# Patient Record
Sex: FEMALE | Race: WHITE | NOT HISPANIC OR LATINO | Employment: UNEMPLOYED | URBAN - METROPOLITAN AREA
[De-identification: names, ages, dates, MRNs, and addresses within clinical notes are randomized per-mention and may not be internally consistent; named-entity substitution may affect disease eponyms.]

---

## 2021-07-24 ENCOUNTER — HOSPITAL ENCOUNTER (EMERGENCY)
Facility: HOSPITAL | Age: 46
Discharge: HOME | End: 2021-07-25
Attending: EMERGENCY MEDICINE | Admitting: STUDENT IN AN ORGANIZED HEALTH CARE EDUCATION/TRAINING PROGRAM
Payer: MEDICAID

## 2021-07-24 DIAGNOSIS — R11.0 NAUSEA: ICD-10-CM

## 2021-07-24 DIAGNOSIS — J30.9 ALLERGIC RHINITIS, UNSPECIFIED SEASONALITY, UNSPECIFIED TRIGGER: ICD-10-CM

## 2021-07-24 DIAGNOSIS — U07.1 COVID-19 VIRUS INFECTION: Primary | ICD-10-CM

## 2021-07-24 LAB
CTP QC/QA: YES
POCT GLUCOSE: 112 MG/DL (ref 70–110)
SARS-COV-2 RDRP RESP QL NAA+PROBE: POSITIVE

## 2021-07-24 PROCEDURE — 25000003 PHARM REV CODE 250: Mod: ER | Performed by: NURSE PRACTITIONER

## 2021-07-24 PROCEDURE — 25000003 PHARM REV CODE 250: Performed by: EMERGENCY MEDICINE

## 2021-07-24 PROCEDURE — U0002 COVID-19 LAB TEST NON-CDC: HCPCS | Mod: ER | Performed by: PHYSICIAN ASSISTANT

## 2021-07-24 PROCEDURE — 99284 EMERGENCY DEPT VISIT MOD MDM: CPT | Mod: 25

## 2021-07-24 PROCEDURE — 63600175 PHARM REV CODE 636 W HCPCS: Performed by: EMERGENCY MEDICINE

## 2021-07-24 PROCEDURE — M0243 CASIRIVI AND IMDEVI INFUSION: HCPCS | Performed by: EMERGENCY MEDICINE

## 2021-07-24 RX ORDER — IBUPROFEN 600 MG/1
600 TABLET ORAL EVERY 6 HOURS PRN
Qty: 20 TABLET | Refills: 0 | OUTPATIENT
Start: 2021-07-24 | End: 2022-07-03

## 2021-07-24 RX ORDER — ONDANSETRON 4 MG/1
4 TABLET, ORALLY DISINTEGRATING ORAL
Status: COMPLETED | OUTPATIENT
Start: 2021-07-24 | End: 2021-07-24

## 2021-07-24 RX ORDER — SODIUM CHLORIDE 0.9 % (FLUSH) 0.9 %
10 SYRINGE (ML) INJECTION
Status: DISCONTINUED | OUTPATIENT
Start: 2021-07-24 | End: 2021-07-25 | Stop reason: HOSPADM

## 2021-07-24 RX ORDER — ALBUTEROL SULFATE 90 UG/1
2 AEROSOL, METERED RESPIRATORY (INHALATION)
Status: DISCONTINUED | OUTPATIENT
Start: 2021-07-24 | End: 2021-07-25 | Stop reason: HOSPADM

## 2021-07-24 RX ORDER — DEXTROMETHORPHAN HYDROBROMIDE, GUAIFENESIN 5; 100 MG/5ML; MG/5ML
650 LIQUID ORAL EVERY 8 HOURS
Qty: 20 TABLET | Refills: 0 | OUTPATIENT
Start: 2021-07-24 | End: 2022-07-03

## 2021-07-24 RX ORDER — DIPHENHYDRAMINE HYDROCHLORIDE 50 MG/ML
25 INJECTION INTRAMUSCULAR; INTRAVENOUS ONCE AS NEEDED
Status: DISCONTINUED | OUTPATIENT
Start: 2021-07-24 | End: 2021-07-25 | Stop reason: HOSPADM

## 2021-07-24 RX ORDER — EPINEPHRINE 0.3 MG/.3ML
0.3 INJECTION SUBCUTANEOUS
Status: DISCONTINUED | OUTPATIENT
Start: 2021-07-24 | End: 2021-07-25 | Stop reason: HOSPADM

## 2021-07-24 RX ORDER — METHYLPREDNISOLONE SOD SUCC 125 MG
40 VIAL (EA) INJECTION ONCE AS NEEDED
Status: DISCONTINUED | OUTPATIENT
Start: 2021-07-24 | End: 2021-07-25 | Stop reason: HOSPADM

## 2021-07-24 RX ORDER — ACETAMINOPHEN 325 MG/1
650 TABLET ORAL ONCE AS NEEDED
Status: DISCONTINUED | OUTPATIENT
Start: 2021-07-24 | End: 2021-07-25 | Stop reason: HOSPADM

## 2021-07-24 RX ORDER — CETIRIZINE HYDROCHLORIDE 10 MG/1
10 TABLET ORAL DAILY
Qty: 30 TABLET | Refills: 0 | OUTPATIENT
Start: 2021-07-24 | End: 2022-07-03

## 2021-07-24 RX ORDER — ONDANSETRON 4 MG/1
4 TABLET, ORALLY DISINTEGRATING ORAL ONCE AS NEEDED
Status: DISCONTINUED | OUTPATIENT
Start: 2021-07-24 | End: 2021-07-25 | Stop reason: HOSPADM

## 2021-07-24 RX ORDER — FLUTICASONE PROPIONATE 50 MCG
1 SPRAY, SUSPENSION (ML) NASAL 2 TIMES DAILY PRN
Qty: 15 G | Refills: 0 | OUTPATIENT
Start: 2021-07-24 | End: 2022-07-03

## 2021-07-24 RX ORDER — ONDANSETRON 4 MG/1
4 TABLET, ORALLY DISINTEGRATING ORAL EVERY 8 HOURS PRN
Qty: 20 TABLET | Refills: 0 | OUTPATIENT
Start: 2021-07-24 | End: 2022-07-03

## 2021-07-24 RX ADMIN — CASIRIVIMAB AND IMDEVIMAB 600 MG: 600; 600 INJECTION, SOLUTION, CONCENTRATE INTRAVENOUS at 10:07

## 2021-07-24 RX ADMIN — ONDANSETRON 4 MG: 4 TABLET, ORALLY DISINTEGRATING ORAL at 08:07

## 2021-07-25 VITALS
SYSTOLIC BLOOD PRESSURE: 137 MMHG | TEMPERATURE: 99 F | OXYGEN SATURATION: 100 % | RESPIRATION RATE: 20 BRPM | DIASTOLIC BLOOD PRESSURE: 87 MMHG | HEART RATE: 85 BPM | BODY MASS INDEX: 35.52 KG/M2 | WEIGHT: 221 LBS | HEIGHT: 66 IN

## 2022-07-03 ENCOUNTER — HOSPITAL ENCOUNTER (EMERGENCY)
Facility: HOSPITAL | Age: 47
Discharge: HOME | End: 2022-07-03
Attending: EMERGENCY MEDICINE
Payer: MEDICAID

## 2022-07-03 VITALS
HEART RATE: 75 BPM | WEIGHT: 230 LBS | RESPIRATION RATE: 17 BRPM | BODY MASS INDEX: 36.96 KG/M2 | SYSTOLIC BLOOD PRESSURE: 130 MMHG | HEIGHT: 66 IN | DIASTOLIC BLOOD PRESSURE: 80 MMHG | TEMPERATURE: 98 F | OXYGEN SATURATION: 98 %

## 2022-07-03 DIAGNOSIS — M25.512 SHOULDER PAIN, LEFT: Primary | ICD-10-CM

## 2022-07-03 LAB — POCT GLUCOSE: 113 MG/DL (ref 70–110)

## 2022-07-03 PROCEDURE — 63600175 PHARM REV CODE 636 W HCPCS: Mod: ER | Performed by: NURSE PRACTITIONER

## 2022-07-03 PROCEDURE — 82962 GLUCOSE BLOOD TEST: CPT | Mod: ER

## 2022-07-03 PROCEDURE — 99284 EMERGENCY DEPT VISIT MOD MDM: CPT | Mod: 25,ER

## 2022-07-03 PROCEDURE — 96372 THER/PROPH/DIAG INJ SC/IM: CPT | Performed by: NURSE PRACTITIONER

## 2022-07-03 RX ORDER — ROSUVASTATIN CALCIUM 20 MG/1
20 TABLET, COATED ORAL NIGHTLY
COMMUNITY
Start: 2022-07-03 | End: 2022-07-03

## 2022-07-03 RX ORDER — DEXTROMETHORPHAN HYDROBROMIDE, GUAIFENESIN 5; 100 MG/5ML; MG/5ML
650 LIQUID ORAL EVERY 8 HOURS
Qty: 20 TABLET | Refills: 0 | Status: SHIPPED | OUTPATIENT
Start: 2022-07-03 | End: 2022-07-08

## 2022-07-03 RX ORDER — ASCORBIC ACID 500 MG
500 TABLET ORAL
COMMUNITY

## 2022-07-03 RX ORDER — IBUPROFEN 600 MG/1
600 TABLET ORAL EVERY 6 HOURS PRN
Qty: 20 TABLET | Refills: 0 | Status: SHIPPED | OUTPATIENT
Start: 2022-07-03 | End: 2022-07-08

## 2022-07-03 RX ORDER — KETOROLAC TROMETHAMINE 30 MG/ML
30 INJECTION, SOLUTION INTRAMUSCULAR; INTRAVENOUS
Status: COMPLETED | OUTPATIENT
Start: 2022-07-03 | End: 2022-07-03

## 2022-07-03 RX ORDER — LANOLIN ALCOHOL/MO/W.PET/CERES
100 CREAM (GRAM) TOPICAL
COMMUNITY

## 2022-07-03 RX ORDER — LOSARTAN POTASSIUM 25 MG/1
25 TABLET ORAL DAILY
COMMUNITY
Start: 2022-02-10

## 2022-07-03 RX ORDER — BLOOD SUGAR DIAGNOSTIC
STRIP MISCELLANEOUS 2 TIMES DAILY
COMMUNITY
Start: 2022-05-29

## 2022-07-03 RX ORDER — DULAGLUTIDE 4.5 MG/.5ML
INJECTION, SOLUTION SUBCUTANEOUS
COMMUNITY
Start: 2022-06-15

## 2022-07-03 RX ORDER — DICLOFENAC SODIUM 10 MG/G
2 GEL TOPICAL 4 TIMES DAILY
Qty: 100 G | Refills: 0 | Status: SHIPPED | OUTPATIENT
Start: 2022-07-03 | End: 2023-01-10

## 2022-07-03 RX ORDER — LEVOCETIRIZINE DIHYDROCHLORIDE 5 MG/1
TABLET, FILM COATED ORAL
COMMUNITY
Start: 2022-06-24 | End: 2022-07-03

## 2022-07-03 RX ORDER — VIT C/E/ZN/COPPR/LUTEIN/ZEAXAN 250MG-90MG
1000 CAPSULE ORAL
COMMUNITY

## 2022-07-03 RX ORDER — DAPAGLIFLOZIN 10 MG/1
10 TABLET, FILM COATED ORAL DAILY
COMMUNITY
Start: 2022-06-15

## 2022-07-03 RX ORDER — CYCLOBENZAPRINE HCL 10 MG
10 TABLET ORAL 3 TIMES DAILY PRN
Qty: 15 TABLET | Refills: 0 | Status: SHIPPED | OUTPATIENT
Start: 2022-07-03 | End: 2022-07-08

## 2022-07-03 RX ADMIN — KETOROLAC TROMETHAMINE 30 MG: 30 INJECTION, SOLUTION INTRAMUSCULAR at 11:07

## 2022-07-03 NOTE — ED PROVIDER NOTES
Encounter Date: 7/3/2022       History     Chief Complaint   Patient presents with    Shoulder Pain     Left shoulder pain, increased with movement, denies injury, works as a      48 y/o female presents to the ED with complaints of left shoulder pain for a while.  Patient states that she is a  and the pain is worse with movement.  She is right hand dominant.  Patient denies trauma, injury, rash, fever, chest pain, SOB, numbness, weakness, tingling, abdominal pain, back pain, dysuria, hematuria, nausea, vomiting, diarrhea, or any other complaints.  Patient rates the pain as 10/10 and has tried a lidoderm patch with some relief but nothing today.                  Review of patient's allergies indicates:  No Known Allergies  Past Medical History:   Diagnosis Date    Diabetes mellitus     Hyperlipemia     Hypertension      History reviewed. No pertinent surgical history.  History reviewed. No pertinent family history.  Social History     Tobacco Use    Smoking status: Never Smoker    Smokeless tobacco: Never Used   Substance Use Topics    Alcohol use: Yes    Drug use: Yes     Types: Marijuana     Review of Systems   Constitutional: Negative for chills and fever.   HENT: Negative for congestion, ear pain, rhinorrhea, sore throat and trouble swallowing.    Eyes: Negative for pain, discharge and redness.   Respiratory: Negative for cough and shortness of breath.    Cardiovascular: Negative for chest pain.   Gastrointestinal: Negative for abdominal pain, diarrhea, nausea and vomiting.   Genitourinary: Negative for decreased urine volume and dysuria.   Musculoskeletal: Positive for arthralgias. Negative for back pain, neck pain and neck stiffness.   Skin: Negative for rash.   Neurological: Negative for dizziness, weakness, light-headedness, numbness and headaches.   Psychiatric/Behavioral: Negative for confusion.       Physical Exam     Initial Vitals [07/03/22 1045]   BP Pulse Resp Temp SpO2    137/77 99 20 98.4 °F (36.9 °C) 100 %      MAP       --         Physical Exam    Nursing note and vitals reviewed.  Constitutional: Vital signs are normal. She appears well-developed.  Non-toxic appearance. She does not appear ill.   HENT:   Head: Normocephalic and atraumatic.   Right Ear: External ear normal.   Left Ear: External ear normal.   Nose: Nose normal.   Mouth/Throat: Oropharynx is clear and moist.   Eyes: Conjunctivae are normal.   Neck:   Normal range of motion.  Cardiovascular: Normal rate and regular rhythm.   Pulmonary/Chest: Effort normal and breath sounds normal. She exhibits no tenderness.   Musculoskeletal:      Left shoulder: Tenderness present. No swelling. Normal range of motion. Normal strength. Normal pulse.      Cervical back: Normal range of motion.      Comments: Tenderness to left shoulder with no swelling, rash, erythema, bruising, or obvious deformity; normal ROM with pain; normal strength and sensation; +2 radial pulse     Neurological: She is alert and oriented to person, place, and time. Gait normal. GCS eye subscore is 4. GCS verbal subscore is 5. GCS motor subscore is 6.   Skin: Skin is warm, dry and intact. No rash noted.   Psychiatric: She has a normal mood and affect. Her speech is normal and behavior is normal. Judgment and thought content normal.         ED Course   Procedures  Labs Reviewed   POCT GLUCOSE - Abnormal; Notable for the following components:       Result Value    POCT Glucose 113 (*)     All other components within normal limits   POCT GLUCOSE, HAND-HELD DEVICE          Imaging Results          X-Ray Shoulder Trauma Left (Final result)  Result time 07/03/22 12:16:21    Final result by Lewis Dahl MD (07/03/22 12:16:21)                 Impression:      No acute displaced fracture-dislocation identified.      Electronically signed by: Lewis Dahl MD  Date:    07/03/2022  Time:    12:16             Narrative:    EXAMINATION:  XR SHOULDER TRAUMA 3 VIEW  LEFT    CLINICAL HISTORY:  Pain in left shoulder    TECHNIQUE:  Three views of the left shoulder were performed.    COMPARISON  None    FINDINGS:  Bones are well mineralized. Overall alignment is within normal limits. No displaced fracture, dislocation or destructive osseous process.  Mild degenerative change noted about the shoulder.  Left lung apex is clear.  No subcutaneous emphysema or radiodense retained foreign body.                                 Medications   ketorolac injection 30 mg (30 mg Intramuscular Given 7/3/22 1115)           APC / Resident Notes:   This is an evaluation of a 47 y.o. female that presents to the Emergency Department for left shoulder pain    Physical Exam shows a non-toxic, afebrile, and well appearing female. Tenderness to left shoulder with no swelling, rash, erythema, bruising, or obvious deformity; normal ROM with pain; normal strength and sensation; +2 radial pulse    Vital signs are reassuring. If available, previous records reviewed.   RESULTS: ; Xray negative     My overall impression is left shoulder pain. I considered, but at this time, do not suspect fracture, dislocation, cellulitis, abscess, septic joint.    ED Course: Xray, CBG, Toradol. Discharge Meds/Instructions: tylenol, Ibuprofen, Voltaren, Flexeril. The diagnosis, treatment plan, instructions for follow-up as well as ED return precautions were discussed. All questions have been answered.                      Clinical Impression:   Final diagnoses:  [M25.512] Shoulder pain, left (Primary)          ED Disposition Condition    Discharge Stable        ED Prescriptions     Medication Sig Dispense Start Date End Date Auth. Provider    acetaminophen (TYLENOL) 650 MG TbSR Take 1 tablet (650 mg total) by mouth every 8 (eight) hours. for 5 days 20 tablet 7/3/2022 7/8/2022 REY Cedillo    ibuprofen (ADVIL,MOTRIN) 600 MG tablet Take 1 tablet (600 mg total) by mouth every 6 (six) hours as needed for Pain. 20 tablet  7/3/2022 7/8/2022 REY Cedillo    cyclobenzaprine (FLEXERIL) 10 MG tablet Take 1 tablet (10 mg total) by mouth 3 (three) times daily as needed for Muscle spasms. 15 tablet 7/3/2022 7/8/2022 REY Cedillo    diclofenac sodium (VOLTAREN) 1 % Gel Apply 2 g topically 4 (four) times daily. for 14 days 100 g 7/3/2022 7/17/2022 REY Cedillo        Follow-up Information     Follow up With Specialties Details Why Contact Info    Vera Bynum MD Orthopedic Surgery Schedule an appointment as soon as possible for a visit in 2 days  [address] [facility] [address]  [phone]      [facility] - Musculosketetal Neuromusculoskeletal Medicine Schedule an appointment as soon as possible for a visit in 2 days  [address] [facility] [address]  [phone]      [facility] ED Emergency Medicine Go to  If symptoms worsen [address] [facility] [address] [phone]           REY Cedillo  07/03/22 1247

## 2022-07-03 NOTE — ED TRIAGE NOTES
Patient presents to ED c/o Shoulder pain, pt reports left shoulder pain with activity, patient states she woke up with sharp shoulder pain with activity.     Denies trauma, hx of arthritis, loss of ROM, fever, chills, numbness/tingling    AAO x 4.

## 2022-07-03 NOTE — Clinical Note
"Caroline Bullockya" Gilberto was seen and treated in our emergency department on 7/3/2022.  She may return to work on 07/05/2022.       If you have any questions or concerns, please don't hesitate to call.      REY Cedillo"

## 2022-08-01 ENCOUNTER — OFFICE VISIT (OUTPATIENT)
Dept: ORTHOPEDICS | Facility: CLINIC | Age: 47
End: 2022-08-01
Payer: MEDICAID

## 2022-08-01 VITALS — HEIGHT: 66 IN | BODY MASS INDEX: 20.89 KG/M2 | WEIGHT: 130 LBS

## 2022-08-01 DIAGNOSIS — M75.52 BURSITIS OF LEFT SHOULDER: ICD-10-CM

## 2022-08-01 PROCEDURE — 99999 PR PBB SHADOW E&M-EST. PATIENT-LVL III: ICD-10-PCS | Mod: PBBFAC,,, | Performed by: ORTHOPAEDIC SURGERY

## 2022-08-01 PROCEDURE — 3008F BODY MASS INDEX DOCD: CPT | Mod: CPTII,,, | Performed by: ORTHOPAEDIC SURGERY

## 2022-08-01 PROCEDURE — 3008F PR BODY MASS INDEX (BMI) DOCUMENTED: ICD-10-PCS | Mod: CPTII,,, | Performed by: ORTHOPAEDIC SURGERY

## 2022-08-01 PROCEDURE — 4010F PR ACE/ARB THEARPY RXD/TAKEN: ICD-10-PCS | Mod: CPTII,,, | Performed by: ORTHOPAEDIC SURGERY

## 2022-08-01 PROCEDURE — 99213 OFFICE O/P EST LOW 20 MIN: CPT | Mod: PBBFAC,PN | Performed by: ORTHOPAEDIC SURGERY

## 2022-08-01 PROCEDURE — 4010F ACE/ARB THERAPY RXD/TAKEN: CPT | Mod: CPTII,,, | Performed by: ORTHOPAEDIC SURGERY

## 2022-08-01 PROCEDURE — 99203 PR OFFICE/OUTPT VISIT, NEW, LEVL III, 30-44 MIN: ICD-10-PCS | Mod: S$PBB,,, | Performed by: ORTHOPAEDIC SURGERY

## 2022-08-01 PROCEDURE — 1159F MED LIST DOCD IN RCRD: CPT | Mod: CPTII,,, | Performed by: ORTHOPAEDIC SURGERY

## 2022-08-01 PROCEDURE — 99999 PR PBB SHADOW E&M-EST. PATIENT-LVL III: CPT | Mod: PBBFAC,,, | Performed by: ORTHOPAEDIC SURGERY

## 2022-08-01 PROCEDURE — 99203 OFFICE O/P NEW LOW 30 MIN: CPT | Mod: S$PBB,,, | Performed by: ORTHOPAEDIC SURGERY

## 2022-08-01 PROCEDURE — 1159F PR MEDICATION LIST DOCUMENTED IN MEDICAL RECORD: ICD-10-PCS | Mod: CPTII,,, | Performed by: ORTHOPAEDIC SURGERY

## 2022-08-01 RX ORDER — IBUPROFEN 800 MG/1
800 TABLET ORAL 2 TIMES DAILY WITH MEALS
Qty: 40 TABLET | Refills: 1 | Status: SHIPPED | OUTPATIENT
Start: 2022-08-01 | End: 2022-08-31

## 2022-08-01 NOTE — PROGRESS NOTES
"Subjective:      Patient ID: Caroline Macias is a 47 y.o. female.    Chief Complaint: Consult (L Shoulder )      HPI  Caroline Macias is a  47 y.o. female presenting today for left shoulder pain.  There was not a history of trauma.  Onset of symptoms began about 3 weeks ago  Symptoms may have been brought on by over use the left arm  She was actually seen at urgent care x-rays taken reported negative for fracture  She was started on a muscle relaxer and some medicated cream  She has also been resting the arm and symptoms have improved  No numbness or tingling reported.      Review of patient's allergies indicates:  No Known Allergies      Current Outpatient Medications   Medication Sig Dispense Refill    ascorbic acid, vitamin C, (VITAMIN C) 500 MG tablet Take 500 mg by mouth.      cyanocobalamin (VITAMIN B-12) 1000 MCG tablet Take 100 mcg by mouth.      FARXIGA 10 mg tablet Take 10 mg by mouth once daily.      losartan (COZAAR) 25 MG tablet Take 25 mg by mouth once daily.      ONETOUCH ULTRA TEST Strp 2 (two) times a day. Test      TRULICITY 4.5 mg/0.5 mL pen injector SMARTSI.5 Milligram(s) SUB-Q Once a Week      cholecalciferol, vitamin D3, (VITAMIN D3) 25 mcg (1,000 unit) capsule Take 1,000 Units by mouth.      diclofenac sodium (VOLTAREN) 1 % Gel Apply 2 g topically 4 (four) times daily. for 14 days 100 g 0    ibuprofen (ADVIL,MOTRIN) 800 MG tablet Take 1 tablet (800 mg total) by mouth 2 (two) times daily with meals. 40 tablet 1     No current facility-administered medications for this visit.       Past Medical History:   Diagnosis Date    Diabetes mellitus     Hyperlipemia     Hypertension        History reviewed. No pertinent surgical history.    Review of Systems:  ROS    OBJECTIVE:     PHYSICAL EXAM:  Height: 5' 6" (167.6 cm) Weight: 59 kg (130 lb)  Vitals:    22 1310   Weight: 59 kg (130 lb)   Height: 5' 6" (1.676 m)   PainSc:   3     Well developed, well nourished female in no acute " distress  Alert and oriented x 3  HEENT- Normal exam  Lungs- Clear to auscultation  Heart- Regular rate and rhythm  Abdomen- Soft nontender  Extremity exam- examination left shoulder no tenderness no swelling no bruising  Range of motion full but she does have a mildly positive impingement sign with abduction internal rotation of the shoulder  No instability  Neurologic exam intact  Rotator cuff strength intact    RADIOGRAPHS:  AP lateral x-ray left shoulder demonstrates mild spurring anterolateral acromion  Comments: I have personally reviewed the imaging and I agree with the above radiologist's report.    ASSESSMENT/PLAN:     IMPRESSION:  Left shoulder impingement bursitis    PLAN:  I explained the nature of the problem to the patient  Fortunately symptoms are improving so I recommended she continue with rest and avoid overhead lifting  I have also started her on Motrin 800 b.i.d. with food  Follow-up 3 weeks for recheck       - We talked at length about the anatomy and pathophysiology of   Encounter Diagnosis   Name Primary?    Bursitis of left shoulder            Disclaimer: This note has been generated using voice-recognition software. There may be typographical errors that have been missed during proof-reading.

## 2023-01-10 ENCOUNTER — OFFICE VISIT (OUTPATIENT)
Dept: ORTHOPEDICS | Facility: CLINIC | Age: 48
End: 2023-01-10
Payer: MEDICAID

## 2023-01-10 VITALS — HEIGHT: 66 IN | BODY MASS INDEX: 20.89 KG/M2 | WEIGHT: 130 LBS

## 2023-01-10 DIAGNOSIS — M75.52 BURSITIS OF LEFT SHOULDER: Primary | ICD-10-CM

## 2023-01-10 PROCEDURE — 1159F MED LIST DOCD IN RCRD: CPT | Mod: CPTII,,, | Performed by: ORTHOPAEDIC SURGERY

## 2023-01-10 PROCEDURE — 99213 OFFICE O/P EST LOW 20 MIN: CPT | Mod: S$PBB,25,, | Performed by: ORTHOPAEDIC SURGERY

## 2023-01-10 PROCEDURE — 99213 OFFICE O/P EST LOW 20 MIN: CPT | Mod: PBBFAC,PN,25 | Performed by: ORTHOPAEDIC SURGERY

## 2023-01-10 PROCEDURE — 3008F BODY MASS INDEX DOCD: CPT | Mod: CPTII,,, | Performed by: ORTHOPAEDIC SURGERY

## 2023-01-10 PROCEDURE — 20610 PR DRAIN/INJECT LARGE JOINT/BURSA: ICD-10-PCS | Mod: S$PBB,LT,, | Performed by: ORTHOPAEDIC SURGERY

## 2023-01-10 PROCEDURE — 20610 DRAIN/INJ JOINT/BURSA W/O US: CPT | Mod: PBBFAC,PN | Performed by: ORTHOPAEDIC SURGERY

## 2023-01-10 PROCEDURE — 99999 PR PBB SHADOW E&M-EST. PATIENT-LVL III: CPT | Mod: PBBFAC,,, | Performed by: ORTHOPAEDIC SURGERY

## 2023-01-10 PROCEDURE — 1159F PR MEDICATION LIST DOCUMENTED IN MEDICAL RECORD: ICD-10-PCS | Mod: CPTII,,, | Performed by: ORTHOPAEDIC SURGERY

## 2023-01-10 PROCEDURE — 20610 DRAIN/INJ JOINT/BURSA W/O US: CPT | Mod: S$PBB,LT,, | Performed by: ORTHOPAEDIC SURGERY

## 2023-01-10 PROCEDURE — 3008F PR BODY MASS INDEX (BMI) DOCUMENTED: ICD-10-PCS | Mod: CPTII,,, | Performed by: ORTHOPAEDIC SURGERY

## 2023-01-10 PROCEDURE — 99999 PR PBB SHADOW E&M-EST. PATIENT-LVL III: ICD-10-PCS | Mod: PBBFAC,,, | Performed by: ORTHOPAEDIC SURGERY

## 2023-01-10 PROCEDURE — 99213 PR OFFICE/OUTPT VISIT, EST, LEVL III, 20-29 MIN: ICD-10-PCS | Mod: S$PBB,25,, | Performed by: ORTHOPAEDIC SURGERY

## 2023-01-10 RX ORDER — IBUPROFEN 600 MG/1
600 TABLET ORAL 2 TIMES DAILY WITH MEALS
Qty: 60 TABLET | Refills: 2 | Status: SHIPPED | OUTPATIENT
Start: 2023-01-10

## 2023-01-10 RX ORDER — TRIAMCINOLONE ACETONIDE 40 MG/ML
40 INJECTION, SUSPENSION INTRA-ARTICULAR; INTRAMUSCULAR
Status: COMPLETED | OUTPATIENT
Start: 2023-01-10 | End: 2023-01-10

## 2023-01-10 RX ORDER — IBUPROFEN 800 MG/1
800 TABLET ORAL 2 TIMES DAILY
COMMUNITY
Start: 2022-12-08

## 2023-01-10 RX ORDER — ROSUVASTATIN CALCIUM 20 MG/1
20 TABLET, COATED ORAL
COMMUNITY
Start: 2022-09-01

## 2023-01-10 RX ORDER — LEVOCETIRIZINE DIHYDROCHLORIDE 5 MG/1
5 TABLET, FILM COATED ORAL
COMMUNITY
Start: 2022-12-16

## 2023-01-10 RX ADMIN — TRIAMCINOLONE ACETONIDE 40 MG: 40 INJECTION, SUSPENSION INTRA-ARTICULAR; INTRAMUSCULAR at 10:01

## 2023-01-10 NOTE — PROGRESS NOTES
"Subjective:      Patient ID: Caroline Macias is a 47 y.o. female.  Chief Complaint: Follow-up of the Left Shoulder      HPI  Caroline Macias is a  47 y.o. female presenting today for follow up of bilateral shoulder pain.  She reports that she is having some ongoing symptoms left shoulder worse than right   Symptoms worse with overhead lifting occasionally at night   No numbness or tingling reported.    Review of patient's allergies indicates:  No Known Allergies      Current Outpatient Medications   Medication Sig Dispense Refill    ascorbic acid, vitamin C, (VITAMIN C) 500 MG tablet Take 500 mg by mouth.      cholecalciferol, vitamin D3, (VITAMIN D3) 25 mcg (1,000 unit) capsule Take 1,000 Units by mouth.      cyanocobalamin (VITAMIN B-12) 1000 MCG tablet Take 100 mcg by mouth.      diclofenac sodium (VOLTAREN) 1 % Gel Apply 2 g topically 4 (four) times daily. for 14 days 100 g 0    FARXIGA 10 mg tablet Take 10 mg by mouth once daily.      ibuprofen (ADVIL,MOTRIN) 800 MG tablet Take 800 mg by mouth 2 (two) times daily.      levocetirizine (XYZAL) 5 MG tablet Take 5 mg by mouth.      losartan (COZAAR) 25 MG tablet Take 25 mg by mouth once daily.      ONETOUCH ULTRA TEST Strp 2 (two) times a day. Test      rosuvastatin (CRESTOR) 20 MG tablet Take 20 mg by mouth.      TRULICITY 4.5 mg/0.5 mL pen injector SMARTSI.5 Milligram(s) SUB-Q Once a Week       No current facility-administered medications for this visit.       Past Medical History:   Diagnosis Date    Diabetes mellitus     Hyperlipemia     Hypertension        No past surgical history on file.    OBJECTIVE:   PHYSICAL EXAM:  Height: 5' 6" (167.6 cm) Weight: 59 kg (130 lb)  Vitals:    01/10/23 0957   Weight: 59 kg (130 lb)   Height: 5' 6" (1.676 m)   PainSc:   3   PainLoc: Shoulder     Ortho/SPM Exam  Examination of the shoulders both shoulders have full range of motion but she does have a positive impingement sign on the left negative on the right rotator cuff " strength intact no instability   Neurologic exam intact    RADIOGRAPHS:  None  Comments: I have personally reviewed the imaging and I agree with the above radiologist's report.    ASSESSMENT/PLAN:     IMPRESSION:  Bilateral shoulder impingement tendinosis    PLAN:  I explained the nature of the problem to the patient   I have ordered some physical therapy for both shoulders to work on rotator cuff strengthening   I also recommended injection for the left shoulder today which is the more symptomatic side   After pause for time-out identified the left shoulder injected with Kenalog 40 mg 2 cc xylocaine sterile technique  Tolerated the procedure well without complication   I have also started her on Motrin twice a day with food  Follow-up 4-6 weeks    FOLLOW UP:  4-6 weeks    Disclaimer: This note has been generated using voice-recognition software. There may be typographical errors that have been missed during proof-reading.

## 2023-01-17 NOTE — PATIENT INSTRUCTIONS
[facility]  OCCUPATIONAL THERAPY  HOME EXERCISE PROGRAM     Cervical Warm-Up  Hold each for 5seconds, repeat for 5 times, do 3 times per day.           Copyright [email or web address]      Therapist: CORTEZ Thomas, OTR/:

## 2023-01-18 ENCOUNTER — CLINICAL SUPPORT (OUTPATIENT)
Dept: REHABILITATION | Facility: HOSPITAL | Age: 48
End: 2023-01-18
Attending: ORTHOPAEDIC SURGERY
Payer: MEDICAID

## 2023-01-18 DIAGNOSIS — M25.512 LEFT SHOULDER PAIN, UNSPECIFIED CHRONICITY: ICD-10-CM

## 2023-01-18 DIAGNOSIS — M75.52 BURSITIS OF LEFT SHOULDER: ICD-10-CM

## 2023-01-18 DIAGNOSIS — R29.898 SHOULDER WEAKNESS: ICD-10-CM

## 2023-01-18 DIAGNOSIS — M75.42 IMPINGEMENT SYNDROME OF LEFT SHOULDER: ICD-10-CM

## 2023-01-18 DIAGNOSIS — M25.612 DECREASED RANGE OF MOTION OF SHOULDER, LEFT: ICD-10-CM

## 2023-01-18 PROCEDURE — 97165 OT EVAL LOW COMPLEX 30 MIN: CPT | Mod: PN

## 2023-01-18 PROCEDURE — 97530 THERAPEUTIC ACTIVITIES: CPT | Mod: PN

## 2023-01-18 NOTE — PLAN OF CARE
[facility]  Occupational Therapy Initial Evaluation    Date: 1/18/2023  Name: Caroline Macias  [facility] Number: [id number]    Therapy Diagnosis:   Encounter Diagnoses   Name Primary?    Bursitis of left shoulder     Left shoulder pain, unspecified chronicity     Decreased range of motion of shoulder, left     Shoulder weakness     Impingement syndrome of left shoulder      Physician: Rogerio Bailey Jr., *    Physician Orders: Eval & Treat  Medical Diagnosis: M75.52 (ICD-10-CM) - Bursitis of left shoulder   Surgical Procedure and Date: N/a  Evaluation Date: 1/18/2023  Insurance Authorization Period Expiration: 1/10/2024  Plan of Care Expiration: 4/18/2023  Progress Note Due: 2/21/2023   Date of Return to MD: 2/22/2023  Visit # / Visits authorized: 1 / 1  FOTO: 1/3    Precautions:  Standard, DM2, HTN    Time In: 10:05AM  Time Out: 10:50AM  Total Appointment Time (timed & untimed codes): 45 minutes    SUBJECTIVE     Date of Onset: ~6/2022    History of Current Condition/Mechanism of Injury/Patient report: Caroline reports: Her arm randomly just started hurting a lot while she was getting her hair done. Her left arm pain moves from front of shoulder to back, and down upper arm. Reaching behind her back hurts. It feels a little better since she got a shot at the last appointment.     Falls: No    Involved Side: Bilateral, Left worse  Dominant Side: Right, but uses both arms to fix hair  Imaging: x-ray 7/3/2022 findings: Bones are well mineralized. Overall alignment is within normal limits. No displaced fracture, dislocation or destructive osseous process.  Mild degenerative change noted about the shoulder.  Left lung apex is clear.  No subcutaneous emphysema or radiodense retained foreign body.   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     Current Functional Status   Home/Living environment: lives with their family      Limitation of Functional Status as follows:   ADLs/IADLs:     - Bathing: Cleaning behind back is a challenge, but hot water helps    - Dressing/Grooming: Putting arm through clothes, pulling up pants/underwear, trouble wiping bottom/reaching behind    Leisure: Bowling, pool - both unable at this time    Pain:  Functional Pain Scale Rating 0-10: Current 2/10, worst 7/10, best 0/10   Location: Left shoulder mainly, All around shoulder, moves down upper arm  Description: Aching and Sharp  Aggravating Factors: Getting out of bed/chair and washing behind her back, reaching, sudden moves  Easing Factors: pain medication, heating pad, injection, and rest    Patient's Goals for Therapy: Feel better and be able to do what I like    Medical History:   Past Medical History:   Diagnosis Date    Diabetes mellitus     Hyperlipemia     Hypertension        Surgical History:    has no past surgical history on file.    Medications:   has a current medication list which includes the following prescription(s): ascorbic acid (vitamin c), cholecalciferol (vitamin d3), cyanocobalamin, diclofenac sodium, farxiga, ibuprofen, ibuprofen, levocetirizine, losartan, onetouch ultra test, rosuvastatin, trulicity, and [DISCONTINUED] cetirizine.    Allergies:   Review of patient's allergies indicates:  No Known Allergies       OBJECTIVE     Palpation: Some pain with palpation to anterior shoulder      Shoulder ROM. Measured in degrees.    (R) UE (unaffected) (L) UE (1/18/23)       AROM AROM Norm   Shoulder Flexion  140 125 180   Shoulder Abduction  140 110 0-180   Shoulder Extension  45 45 0-50   Shoulder Internal Rotation 90 45 0-90   Shoulder External Rotation  90 68 0-90   Comment: Painful with all motions    Manual Muscle Test:   Patient decline to participate due to pain, 3+ for all shoulder motions in limited ROM, pain with any resistance  Shoulder  Flexion RUE:      Shoulder Extension RUE:     Shoulder Abduction RUE:      Internal Rotation RUE:     External Rotation RUE:           Shoulder Flexion LUE:     Shoulder Extension LUE:     Shoulder Abduction LUE:     Internal Rotation LUE:      External Rotation LUE:              Special Tests:   TEST 1/18/2023 1/18/2023   Shoulder Left Right   Bentley Tao Positive  Negative    Neer Imgingement Positive  Negative    Yergason's  Negative  Negative    Empty Can (Supraspinatus) Positive  Negative      Posture Alignment : slouched posture, forward head    Joint integrity: Normal    Scapular Control/Dyskinesis:    Normal / Subtle / Obvious   Left Subtle   Right Normal       Limitation/Restriction for FOTO Shoulder Survey    Therapist reviewed FOTO scores for Caroline Macias on 1/18/2023.   FOTO documents entered into [employer] - see Media section.    Limitation Score: 52%         Treatment     Total Treatment time (time-based codes) separate from Evaluation: 15 minutes    Caroline received the treatments listed below:     Therapeutic activities to improve functional performance for 15  minutes, including:  - Provided initial HEP  - Cervical neck retractions (Onofre method)  - Gentle doorway stretch  - Shoulder rolls  - Scapular retractors    Patient Education and Home Exercises      Education provided:   - HEP  - Plan of care  - Goals    Written Home Exercises Provided: yes.  Exercises were reviewed and Caroline was able to demonstrate them prior to the end of the session.  Caroline demonstrated good  understanding of the education provided. See EMR under Patient Instructions for exercises provided during therapy sessions.     Pt was advised to perform these exercises free of pain, and to stop performing them if pain occurs.    Patient/Family Education: role of OT, goals for OT, scheduling/cancellations - pt verbalized understanding. Discussed insurance limitations with patient.    ASSESSMENT     Caroline Macias is a 47 y.o. female  referred to outpatient occupational therapy and presents with a medical diagnosis of bursitis of L shoulder. Tests positive for L shoulder subacromial impingement. Patient presents with the following therapy deficits: Decreased ROM, Decreased muscle strength, and Increased pain and demonstrates limitations as described in the chart below. Following medical record review it is determined that pt will benefit from occupational therapy services in order to maximize pain free and/or functional use of left shoulder/arm. The following goals were discussed with the patient and patient is in agreement with them as to be addressed in the treatment plan. The patient's rehab potential is Good.     Anticipated barriers to occupational therapy: None  Pt has no cultural, educational or language barriers to learning provided.    Profile and History Assessment of Occupational Performance Level of Clinical Decision Making Complexity Score   Occupational Profile:   Caroline Macias is a 47 y.o. female who lives with their family and is currently employed Caroline Macias has difficulty with  ADLs and IADLs as listed previously, which  Affecting their daily functional abilities.      Comorbidities:    has a past medical history of Diabetes mellitus, Hyperlipemia, and Hypertension.    Medical and Therapy History Review:   Brief     Performance Deficits    Physical:  Joint Mobility  Muscle Power/Strength  Muscle Endurance  Control of Voluntary Movement  Muscle Tone  Postural Control  Pain    Cognitive:  No Deficits    Psychosocial:    No Deficits     Clinical Decision Making:  low    Assessment Process:  Problem-Focused Assessments    Modification/Need for Assistance:  Not Necessary    Intervention Selection:  Limited Treatment Options       low  Based on PMHX, co morbidities , data from assessments and functional level of assistance required with task and clinical presentation directly impacting function.       The following goals were  discussed with the patient and patient is in agreement with them as to be addressed in the treatment plan.     Goals:     Short Term Goals (STGs); to be met within 5 weeks (2/21/2023).  1)  Patient to be IND with HEP to maximize R shoulder functional capacity.  2)  Patient will report IND use of modalities at home for pain management.  3)  Assess MMT of shoulder by 2/1/2023  4)  Patient will report a pain level of 0 out of 10 during active range of motion without resistance  5) Increase shoulder external/internal rotation ROM by 25 degrees to increase functional arm use for ADLs/work/leisure activities.    Long Term Goals :To be met by discharge.  1) Patient will report a pain level of 0 out of 10 during lifting of objects at home.  2) Increase shoulder strength to 4+/5 in all shoulder motions without pain.   3) Increase ROM by 45 degrees to increase functional shoulder use for ADLs/work/leisure activities.  4) Patient will demo improved FOTO score by 20 points.  5) Pt will return to prior level of function for ADLs, IADLs, and household management.      PLAN   Plan of Care Certification: 1/18/2023 to 4/18/2023.     Outpatient Occupational Therapy 2 times weekly for 6 weeks to include the following interventions: Manual therapy/joint mobilizations, Modalities for pain management, Therapeutic exercises/activities., and Strengthening.      CORTEZ Thomas, OTR/L      I CERTIFY THE NEED FOR THESE SERVICES FURNISHED UNDER THIS PLAN OF TREATMENT AND WHILE UNDER MY CARE  Physician's comments:      Physician's Signature: ___________________________________________________

## 2023-01-23 NOTE — PROGRESS NOTES
"  [facility]  Occupational Therapy Treatment Note    Date: 1/24/2023  Name: Caroline Macias  [facility] Number: [id number]    Therapy Diagnosis: No diagnosis found.  Physician: Rogerio Bailey Jr., *    Physician Orders: Eval & Treat  Medical Diagnosis: M75.52 (ICD-10-CM) - Bursitis of left shoulder   Surgical Procedure and Date: N/a  Evaluation Date: 1/18/2023  Insurance Authorization Period Expiration: 1/10/2024  Plan of Care Expiration: 4/18/2023  Progress Note Due: 2/21/2023   Date of Return to MD: 2/22/2023  Visit # / Visits authorized: 1 / 1  FOTO: 1/3     Precautions:  Standard, DM2, HTN    Time In: 1:10PM  Time Out: 1:45PM  Total Billable Time: 35 minutes    SUBJECTIVE     Pt reports: "I have been able to tolerate a little bit of pain but not been pushing through too much while doing my home exercises."     She was compliant with home exercise program given last session, completes some of the home exercise program every day.   Response to previous treatment: Good  Functional change: None     Pain: 5/10 upon arrival, 2/10 upon end of session   Location: left shoulder      OBJECTIVE     Objective Measures updated at progress report unless specified.  Palpation: Some pain with palpation to anterior shoulder       Shoulder ROM. Measured in degrees.    (R) UE (unaffected) (L) UE (1/18/23)       AROM AROM Norm   Shoulder Flexion  140 125 180   Shoulder Abduction  140 110 0-180   Shoulder Extension  45 45 0-50   Shoulder Internal Rotation 90 45 0-90   Shoulder External Rotation  90 68 0-90   Comment: Painful with all motions     Manual Muscle Test:   Patient decline to participate due to pain, 3+ for all shoulder motions in limited ROM, pain with any resistance  Shoulder Flexion RUE:   Defer   Shoulder Extension RUE:  -   Shoulder Abduction RUE:   -   Internal Rotation RUE:  -   External Rotation RUE:  -         Shoulder Flexion LUE:  -   Shoulder Extension LUE:  -   Shoulder Abduction LUE:  - "   Internal Rotation LUE:   -   External Rotation LUE:   -            Special Tests:   TEST 1/18/2023 1/18/2023   Shoulder Left Right   Bentley Tao Positive  Negative    Neer Imgingement Positive  Negative    Yergason's  Negative  Negative    Empty Can (Supraspinatus) Positive  Negative       Posture Alignment : slouched posture, forward head     Joint integrity: Normal     Scapular Control/Dyskinesis:     Normal / Subtle / Obvious   Left Subtle   Right Normal         Limitation/Restriction for FOTO Shoulder Survey     Therapist reviewed FOTO scores for Caroline Macias on 1/18/2023.   FOTO documents entered into [employer] - see Media section.     Limitation Score: 52%         Treatment     Caroline received the treatments listed below:     Therapeutic activities to improve functional performance for 35  minutes, including:  - Moist heat to warm shoulder up prior to exercise x3 mins  - Aerobic warm up on arm bike x7 mins  - Shoulder CARs x10 each  - Low Ys on wall x10  - Ws on wall x10  - Manual therapy and joint mobilizations to shoulder   - Shoulder rolls  - Scapular retractors    Patient Education and Home Exercises      Education provided:   - Plan of care  - HEP  - Progress towards goals     Written Home Exercises Provided: Patient instructed to cont prior HEP.  Exercises were reviewed and Caroline was able to demonstrate them prior to the end of the session.  Caroline demonstrated good  understanding of the HEP provided. See EMR under Patient Instructions for exercises provided during therapy sessions.       Assessment     Pt would continue to benefit from skilled OT. Patient participated well in skilled OT session focused on scapular strengthening and joint mobilization to release stress and tension in guarding musculature, and support proper ergonomics. Good response, improvements in pain following.     Caroline is progressing well towards her goals and there are no updates to goals at this time. Pt prognosis is Good.     Pt  will continue to benefit from skilled outpatient occupational therapy to address the deficits listed in the problem list on initial evaluation provide pt/family education and to maximize pt's level of independence in the home and community environment.     Pt's spiritual, cultural and educational needs considered and pt agreeable to plan of care and goals.    Anticipated barriers to occupational therapy: None    Goals:  Short Term Goals (STGs); to be met within 5 weeks (2/21/2023).  1)  Patient to be IND with HEP to maximize R shoulder functional capacity. - progressing not met 1/24/2023   2)  Patient will report IND use of modalities at home for pain management.- progressing not met  1/24/2023   3)  Assess MMT of shoulder by 2/1/2023. - progressing not met  1/24/2023   4)  Patient will report a pain level of 0 out of 10 during active range of motion without resistance. - progressing not met  1/24/2023    5) Increase shoulder external/internal rotation ROM by 25 degrees to increase functional arm use for ADLs/work/leisure activities. - progressing not met  1/24/2023      Long Term Goals :To be met by discharge.  1) Patient will report a pain level of 0 out of 10 during lifting of objects at home. - progressing not met  1/24/2023   2) Increase shoulder strength to 4+/5 in all shoulder motions without pain. - progressing not met  1/24/2023   3) Increase ROM by 45 degrees to increase functional shoulder use for ADLs/work/leisure activities. - progressing not met  1/24/2023   4) Patient will demo improved FOTO score by 20 points. - progressing not met  1/24/2023   5) Pt will return to prior level of function for ADLs, IADLs, and household management. - progressing not met  1/24/2023     PLAN     Plan to continue as per individualized plan of care with Occupational Therapy 2 times weekly for 6 weeks to include the following interventions: Manual therapy/joint mobilizations, Modalities for pain management, Therapeutic  exercises/activities., and Strengthening.     Updates/Grading for next session: Joint mobs, aerobic, release guarding      BEAR Card, OTD, OTR/L

## 2023-01-24 ENCOUNTER — CLINICAL SUPPORT (OUTPATIENT)
Dept: REHABILITATION | Facility: HOSPITAL | Age: 48
End: 2023-01-24
Attending: ORTHOPAEDIC SURGERY
Payer: MEDICAID

## 2023-01-24 DIAGNOSIS — M75.42 IMPINGEMENT SYNDROME OF LEFT SHOULDER: ICD-10-CM

## 2023-01-24 DIAGNOSIS — M25.612 DECREASED RANGE OF MOTION OF SHOULDER, LEFT: ICD-10-CM

## 2023-01-24 DIAGNOSIS — M25.512 LEFT SHOULDER PAIN, UNSPECIFIED CHRONICITY: Primary | ICD-10-CM

## 2023-01-24 DIAGNOSIS — R29.898 SHOULDER WEAKNESS: ICD-10-CM

## 2023-01-24 PROCEDURE — 97530 THERAPEUTIC ACTIVITIES: CPT | Mod: PN

## 2023-01-25 NOTE — PROGRESS NOTES
"  [facility]  Occupational Therapy Treatment Note    Date: 1/26/2023  Name: Caroline Macias  [facility] Number: [id number]    Therapy Diagnosis: No diagnosis found.  Physician: Rogerio Bailey Jr., *    Physician Orders: Eval & Treat  Medical Diagnosis: M75.52 (ICD-10-CM) - Bursitis of left shoulder   Surgical Procedure and Date: N/a  Evaluation Date: 1/18/2023  Insurance Authorization Period Expiration: 1/10/2024  Plan of Care Expiration: 4/18/2023  Progress Note Due: 2/21/2023   Date of Return to MD: 2/22/2023  Visit # / Visits authorized: 2/20 (3/12 in POC)  FOTO: 1/3     Precautions:  Standard, DM2, HTN    Time In: 7:50AM  Time Out: 8:30  Total Billable Time: 40 minutes    SUBJECTIVE     Pt reports: "I have been feeling the same as by the end of last time. I am going on a last minute vacation next week though so I have to cancel."    She was compliant with home exercise program given last session, completes some of the home exercise program every day.   Response to previous treatment: Fine  Functional change: Things like laundry and dishes are easier     Pain: 2/10 upon arrival, 2/10 upon end of session   Location: left shoulder      OBJECTIVE     Objective Measures updated at progress report unless specified.  Palpation: Some pain with palpation to anterior shoulder       Shoulder ROM. Measured in degrees.    (R) UE (unaffected) (L) UE (1/18/23)       AROM AROM Norm   Shoulder Flexion  140 125 180   Shoulder Abduction  140 110 0-180   Shoulder Extension  45 45 0-50   Shoulder Internal Rotation 90 45 0-90   Shoulder External Rotation  90 68 0-90   Comment: Painful with all motions     Manual Muscle Test:   Patient decline to participate due to pain, 3+ for all shoulder motions in limited ROM, pain with any resistance  Shoulder Flexion RUE:   Defer   Shoulder Extension RUE:  -   Shoulder Abduction RUE:   -   Internal Rotation RUE:  -   External Rotation RUE:  -         Shoulder Flexion LUE:  " -   Shoulder Extension LUE:  -   Shoulder Abduction LUE:  -   Internal Rotation LUE:   -   External Rotation LUE:   -            Special Tests:   TEST 1/18/2023 1/18/2023   Shoulder Left Right   Bentley Tao Positive  Negative    Neer Imgingement Positive  Negative    Yergason's  Negative  Negative    Empty Can (Supraspinatus) Positive  Negative       Posture Alignment : slouched posture, forward head     Joint integrity: Normal     Scapular Control/Dyskinesis:     Normal / Subtle / Obvious   Left Subtle   Right Normal         Limitation/Restriction for FOTO Shoulder Survey     Therapist reviewed FOTO scores for Caroline Macias on 1/18/2023.   FOTO documents entered into [employer] - see Media section.     Limitation Score: 52%         Treatment     Caroline received the treatments listed below:     Therapeutic activities to improve functional performance for 40  minutes, including:  - Aerobic warm up on arm bike x8 mins  - Arm pulleys x15ea  - Naeem reaches in supine x10  - Palm up arm paises in supine x10  - Joint mobilizations x5 mins to shoulder  - Reverse shoulder circles x10 for scapular strength and stability  - Low Ys on wall in neutral wrist x10   - Ws on the wall, neutral wrist x10    Patient Education and Home Exercises      Education provided:   - Plan of care  - HEP  - Progress towards goals     Written Home Exercises Provided: Patient instructed to cont prior HEP.  Exercises were reviewed and Caroline was able to demonstrate them prior to the end of the session.  Caroline demonstrated good  understanding of the HEP provided. See EMR under Patient Instructions for exercises provided during therapy sessions.       Assessment     Pt would continue to benefit from skilled OT. Patient participated well in skilled OT session focused on range of motion in a tolerable range, as well as gentle strengthening of scapular muscles. Decreased guarding noted today. Good response, improvements in pain following.     Caroline is  progressing well towards her goals and there are no updates to goals at this time. Pt prognosis is Good.     Pt will continue to benefit from skilled outpatient occupational therapy to address the deficits listed in the problem list on initial evaluation provide pt/family education and to maximize pt's level of independence in the home and community environment.     Pt's spiritual, cultural and educational needs considered and pt agreeable to plan of care and goals.    Anticipated barriers to occupational therapy: None    Goals:  Short Term Goals (STGs); to be met within 5 weeks (2/21/2023).  1)  Patient to be IND with HEP to maximize R shoulder functional capacity. - progressing not met 1/26/2023   2)  Patient will report IND use of modalities at home for pain management.- progressing not met  1/26/2023   3)  Assess MMT of shoulder by 2/1/2023. - progressing not met  1/26/2023   4)  Patient will report a pain level of 0 out of 10 during active range of motion without resistance. - progressing not met  1/26/2023    5) Increase shoulder external/internal rotation ROM by 25 degrees to increase functional arm use for ADLs/work/leisure activities. - progressing not met  1/26/2023      Long Term Goals :To be met by discharge.  1) Patient will report a pain level of 0 out of 10 during lifting of objects at home. - progressing not met  1/26/2023   2) Increase shoulder strength to 4+/5 in all shoulder motions without pain. - progressing not met  1/26/2023   3) Increase ROM by 45 degrees to increase functional shoulder use for ADLs/work/leisure activities. - progressing not met  1/26/2023   4) Patient will demo improved FOTO score by 20 points. - progressing not met  1/26/2023   5) Pt will return to prior level of function for ADLs, IADLs, and household management. - progressing not met  1/26/2023     PLAN     Plan to continue as per individualized plan of care with Occupational Therapy 2 times weekly for 6 weeks to include  the following interventions: Manual therapy/joint mobilizations, Modalities for pain management, Therapeutic exercises/activities., and Strengthening.     Updates/Grading for next session: Joint mobs, decrease guarding, mat activities      BEAR Card, OTKIESHA, OTR/L

## 2023-01-26 ENCOUNTER — CLINICAL SUPPORT (OUTPATIENT)
Dept: REHABILITATION | Facility: HOSPITAL | Age: 48
End: 2023-01-26
Attending: ORTHOPAEDIC SURGERY
Payer: MEDICAID

## 2023-01-26 DIAGNOSIS — R29.898 SHOULDER WEAKNESS: ICD-10-CM

## 2023-01-26 DIAGNOSIS — M25.612 DECREASED RANGE OF MOTION OF SHOULDER, LEFT: ICD-10-CM

## 2023-01-26 DIAGNOSIS — M25.512 LEFT SHOULDER PAIN, UNSPECIFIED CHRONICITY: Primary | ICD-10-CM

## 2023-01-26 DIAGNOSIS — M75.42 IMPINGEMENT SYNDROME OF LEFT SHOULDER: ICD-10-CM

## 2023-01-26 PROCEDURE — 97530 THERAPEUTIC ACTIVITIES: CPT | Mod: PN

## 2023-02-03 NOTE — PROGRESS NOTES
"  [facility]  Occupational Therapy Treatment Note    Date: 2/6/2023  Name: Caroline Macias  [facility] Number: [id number]    Therapy Diagnosis:   Encounter Diagnoses   Name Primary?    Left shoulder pain, unspecified chronicity Yes    Decreased range of motion of shoulder, left     Shoulder weakness     Impingement syndrome of left shoulder      Physician: Rogerio Bailey Jr., *    Physician Orders: Eval & Treat  Medical Diagnosis: M75.52 (ICD-10-CM) - Bursitis of left shoulder   Surgical Procedure and Date: N/a  Evaluation Date: 1/18/2023  Insurance Authorization Period Expiration: 1/10/2024  Plan of Care Expiration: 4/18/2023  Progress Note Due: 2/21/2023   Date of Return to MD: 2/22/2023  Visit # / Visits authorized: 3/20 (4/12 in POC)  FOTO: 1/3     Precautions:  Standard, DM2, HTN    Time In: 1:05PM  Time Out: 1:45PM  Total Billable Time: 40 minutes    SUBJECTIVE     Pt reports: "I was on a trip all last week and I did the exercises 2-3 times a day."    She was compliant with home exercise program given last session, completes some of the home exercise program every day.   Response to previous treatment: N/a  Functional change: None since last session, was on vacation     Pain: 0/10 upon arrival, 0/10 upon end of session   Location: left shoulder      OBJECTIVE     Objective Measures updated at progress report unless specified.    Palpation: Some pain with palpation to anterior shoulder       Shoulder ROM. Measured in degrees.    (R) UE (unaffected) (L) UE (1/18/23)       AROM AROM Norm   Shoulder Flexion  140 125 180   Shoulder Abduction  140 110 0-180   Shoulder Extension  45 45 0-50   Shoulder Internal Rotation 90 45 0-90   Shoulder External Rotation  90 68 0-90   Comment: Painful with all motions     Manual Muscle Test:   Patient decline to participate due to pain, 3+ for all shoulder motions in limited ROM, pain with any resistance  Shoulder Flexion RUE:   Defer   Shoulder Extension " RUE:  -   Shoulder Abduction RUE:   -   Internal Rotation RUE:  -   External Rotation RUE:  -         Shoulder Flexion LUE:  -   Shoulder Extension LUE:  -   Shoulder Abduction LUE:  -   Internal Rotation LUE:   -   External Rotation LUE:   -            Special Tests:   TEST 1/18/2023 1/18/2023   Shoulder Left Right   Bentley Tao Positive  Negative    Neer Imgingement Positive  Negative    Yergason's  Negative  Negative    Empty Can (Supraspinatus) Positive  Negative       Posture Alignment : slouched posture, forward head     Joint integrity: Normal     Scapular Control/Dyskinesis:     Normal / Subtle / Obvious   Left Subtle   Right Normal         Limitation/Restriction for FOTO Shoulder Survey     Therapist reviewed FOTO scores for Caroline Macias on 1/18/2023.   FOTO documents entered into [employer] - see Media section.     Limitation Score: 52%         Treatment     Caroline received the treatments listed below:     Therapeutic activities to improve functional performance for 40  minutes, including:  - Aerobic warm up on arm bike x8 mins  - Arm raises supine with 3# bar x20  - Chest press supine with 3# bar x20  - Supine new wings unweighted x20   - Ws on wall x15  - Robot arms on wall x20  - Scalene neck stretches assisted   - New arms on wall (decreased ROM to make pain-free) x10    Patient Education and Home Exercises      Education provided:   - Eliminating gravity during exercises  - Plan of care  - HEP  - Progress towards goals     Written Home Exercises Provided: Patient instructed to cont prior HEP.  Exercises were reviewed and Caroline was able to demonstrate them prior to the end of the session.  Caroline demonstrated good  understanding of the HEP provided. See EMR under Patient Instructions for exercises provided during therapy sessions.       Assessment     Pt would continue to benefit from skilled OT. Patient participated well in skilled OT session focused on range of motion in a tolerable range, many  improvements noted today especially with pain during activity. Required repeated cues for decreasing speed of exercises. Fatigue noted today following exercises, but patient stated she felt better than ever following session. Improved pain-free range of motion and more shoulder internal/external rotation notable today.     Caroline is progressing well towards her goals and there are no updates to goals at this time. Pt prognosis is Good.     Pt will continue to benefit from skilled outpatient occupational therapy to address the deficits listed in the problem list on initial evaluation provide pt/family education and to maximize pt's level of independence in the home and community environment.     Pt's spiritual, cultural and educational needs considered and pt agreeable to plan of care and goals.    Anticipated barriers to occupational therapy: None    Goals:  Short Term Goals (STGs); to be met within 5 weeks (2/21/2023).  1)  Patient to be IND with HEP to maximize R shoulder functional capacity. - progressing not met 2/6/2023   2)  Patient will report IND use of modalities at home for pain management.- progressing not met  2/6/2023   3)  Assess MMT of shoulder by 2/1/2023. - progressing not met  2/6/2023   4)  Patient will report a pain level of 0 out of 10 during active range of motion without resistance. - met  2/6/2023    5) Increase shoulder external/internal rotation ROM by 25 degrees to increase functional arm use for ADLs/work/leisure activities. - progressing not met  2/6/2023      Long Term Goals :To be met by discharge.  1) Patient will report a pain level of 0 out of 10 during lifting of objects at home. - progressing not met  2/6/2023   2) Increase shoulder strength to 4+/5 in all shoulder motions without pain. - progressing not met  2/6/2023   3) Increase ROM by 45 degrees to increase functional shoulder use for ADLs/work/leisure activities. - progressing not met  2/6/2023   4) Patient will demo improved  FOTO score by 20 points. - progressing not met  2/6/2023   5) Pt will return to prior level of function for ADLs, IADLs, and household management. - progressing not met  2/6/2023   6) NEW: Pt will report 0/10 pain with sudden movements of the shoulder - new 2/6/2023     PLAN     Plan to continue as per individualized plan of care with Occupational Therapy 2 times weekly for 6 weeks to include the following interventions: Manual therapy/joint mobilizations, Modalities for pain management, Therapeutic exercises/activities., and Strengthening.     Updates/Grading for next session: Joint mobs, mat activities continue with increased weight and stretch      BEAR Card, OTD, OTR/L

## 2023-02-06 ENCOUNTER — CLINICAL SUPPORT (OUTPATIENT)
Dept: REHABILITATION | Facility: HOSPITAL | Age: 48
End: 2023-02-06
Attending: ORTHOPAEDIC SURGERY
Payer: MEDICAID

## 2023-02-06 DIAGNOSIS — R29.898 SHOULDER WEAKNESS: ICD-10-CM

## 2023-02-06 DIAGNOSIS — M75.42 IMPINGEMENT SYNDROME OF LEFT SHOULDER: ICD-10-CM

## 2023-02-06 DIAGNOSIS — M25.612 DECREASED RANGE OF MOTION OF SHOULDER, LEFT: ICD-10-CM

## 2023-02-06 DIAGNOSIS — M25.512 LEFT SHOULDER PAIN, UNSPECIFIED CHRONICITY: Primary | ICD-10-CM

## 2023-02-06 PROCEDURE — 97530 THERAPEUTIC ACTIVITIES: CPT | Mod: PN

## 2023-02-07 NOTE — PROGRESS NOTES
"  [facility]  Occupational Therapy Treatment Note    Date: 2/13/2023  Name: Caroline Macias  [facility] Number: [id number]    Therapy Diagnosis:   Encounter Diagnoses   Name Primary?    Left shoulder pain, unspecified chronicity Yes    Decreased range of motion of shoulder, left     Shoulder weakness     Impingement syndrome of left shoulder        Physician: Rogerio Bailey Jr., *    Physician Orders: Eval & Treat  Medical Diagnosis: M75.52 (ICD-10-CM) - Bursitis of left shoulder   Surgical Procedure and Date: N/a  Evaluation Date: 1/18/2023  Insurance Authorization Period Expiration: 1/10/2024  Plan of Care Expiration: 4/18/2023  Progress Note Due: 2/21/2023   Date of Return to MD: 2/22/2023  Visit # / Visits authorized: 4/20 (5/12 in POC)  FOTO: 1/3     Precautions:  Standard, DM2, HTN    Time In: 12:25  Time Out: 1:00  Total Billable Time: 35 minutes    SUBJECTIVE     Pt reports: "Everything is coming along slowly but surely. I have to try not to sleep on it."    She was compliant with home exercise program given last session, completes some of the home exercise program every day.   Response to previous treatment: Good, a little sore  Functional change: Able to complete things better around the house      Pain: 1/10 upon arrival, 0/10 upon end of session   Location: left shoulder      OBJECTIVE     Objective Measures updated at progress report unless specified.    Palpation: Some pain with palpation to anterior shoulder       Shoulder ROM. Measured in degrees.    (R) UE (unaffected) (L) UE (1/18/23)       AROM AROM Norm   Shoulder Flexion  140 125 180   Shoulder Abduction  140 110 0-180   Shoulder Extension  45 45 0-50   Shoulder Internal Rotation 90 45 0-90   Shoulder External Rotation  90 68 0-90   Comment: Painful with all motions     Manual Muscle Test:   Patient decline to participate due to pain, 3+ for all shoulder motions in limited ROM, pain with any resistance  Shoulder Flexion " RUE:   Defer   Shoulder Extension RUE:  -   Shoulder Abduction RUE:   -   Internal Rotation RUE:  -   External Rotation RUE:  -         Shoulder Flexion LUE:  -   Shoulder Extension LUE:  -   Shoulder Abduction LUE:  -   Internal Rotation LUE:   -   External Rotation LUE:   -            Special Tests:   TEST 1/18/2023 1/18/2023   Shoulder Left Right   Mc Burger Positive  Negative    Neer Imgingement Positive  Negative    Yergason's  Negative  Negative    Empty Can (Supraspinatus) Positive  Negative       Posture Alignment : slouched posture, forward head     Joint integrity: Normal     Scapular Control/Dyskinesis:     Normal / Subtle / Obvious   Left Subtle   Right Normal         Limitation/Restriction for FOTO Shoulder Survey     Therapist reviewed FOTO scores for Caroline Macias on 1/18/2023.   FOTO documents entered into [employer] - see Media section.     Limitation Score: 52%         Treatment     Caroline received the treatments listed below:     Therapeutic activities to improve functional performance for 35 minutes, including:  - Pulleys x20  - Shoulder external/internal rotation and strengthening. Yellow TB 2x10ea   - ABCs for shoulder stability 500gram ball  - Wall walking x6 ea flexion and abduction  - Shoulder extension 2x10ea  yellow TB  - robot arms x10    Patient Education and Home Exercises      Education provided:   - Plan of care  - HEP  - Progress towards goals     Written Home Exercises Provided: Patient instructed to cont prior HEP.  Exercises were reviewed and Caroline was able to demonstrate them prior to the end of the session.  Caroline demonstrated good  understanding of the HEP provided. See EMR under Patient Instructions for exercises provided during therapy sessions.       Assessment     Pt would continue to benefit from skilled OT. Patient participated well in skilled OT session focused on gentle strengthening. Cues for proper form and ergonomics to support shoulder stability during exercises  today. Good response today, stabilization exercises with good fatigue. Improved pain-free range of motion today.      Caroline is progressing well towards her goals and there are no updates to goals at this time. Pt prognosis is Good.     Pt will continue to benefit from skilled outpatient occupational therapy to address the deficits listed in the problem list on initial evaluation provide pt/family education and to maximize pt's level of independence in the home and community environment.     Pt's spiritual, cultural and educational needs considered and pt agreeable to plan of care and goals.    Anticipated barriers to occupational therapy: None    Goals:  Short Term Goals (STGs); to be met within 5 weeks (2/21/2023).  1)  Patient to be IND with HEP to maximize R shoulder functional capacity. - progressing not met 2/13/2023   2)  Patient will report IND use of modalities at home for pain management.- progressing not met  2/13/2023   3)  Assess MMT of shoulder by 2/1/2023. - progressing not met  2/13/2023   4)  Patient will report a pain level of 0 out of 10 during active range of motion without resistance. - met  2/6/2023    5) Increase shoulder external/internal rotation ROM by 25 degrees to increase functional arm use for ADLs/work/leisure activities. - progressing not met  2/13/2023      Long Term Goals :To be met by discharge.  1) Patient will report a pain level of 0 out of 10 during lifting of objects at home. - progressing not met  2/13/2023   2) Increase shoulder strength to 4+/5 in all shoulder motions without pain. - progressing not met  2/13/2023   3) Increase ROM by 45 degrees to increase functional shoulder use for ADLs/work/leisure activities. - progressing not met  2/13/2023   4) Patient will demo improved FOTO score by 20 points. - progressing not met  2/13/2023   5) Pt will return to prior level of function for ADLs, IADLs, and household management. - progressing not met  2/13/2023   6) NEW: Pt will  report 0/10 pain with sudden movements of the shoulder - new 2/13/2023     PLAN     Plan to continue as per individualized plan of care with Occupational Therapy 2 times weekly for 6 weeks to include the following interventions: Manual therapy/joint mobilizations, Modalities for pain management, Therapeutic exercises/activities., and Strengthening.     Updates/Grading for next session: Progress note      BEAR Card, OTD, OTR/L

## 2023-02-13 ENCOUNTER — CLINICAL SUPPORT (OUTPATIENT)
Dept: REHABILITATION | Facility: HOSPITAL | Age: 48
End: 2023-02-13
Attending: ORTHOPAEDIC SURGERY
Payer: MEDICAID

## 2023-02-13 DIAGNOSIS — M25.512 LEFT SHOULDER PAIN, UNSPECIFIED CHRONICITY: Primary | ICD-10-CM

## 2023-02-13 DIAGNOSIS — M75.42 IMPINGEMENT SYNDROME OF LEFT SHOULDER: ICD-10-CM

## 2023-02-13 DIAGNOSIS — M25.612 DECREASED RANGE OF MOTION OF SHOULDER, LEFT: ICD-10-CM

## 2023-02-13 DIAGNOSIS — R29.898 SHOULDER WEAKNESS: ICD-10-CM

## 2023-02-13 PROCEDURE — 97530 THERAPEUTIC ACTIVITIES: CPT | Mod: PN

## 2023-02-14 NOTE — PROGRESS NOTES
"  [facility]   Outpatient Therapy Updated Plan of Care     Date: 2/16/2023  Name: Caroline Macias  [facility] Number: [id number]    Therapy Diagnosis:   Encounter Diagnoses   Name Primary?    Left shoulder pain, unspecified chronicity Yes    Decreased range of motion of shoulder, left     Shoulder weakness     Impingement syndrome of left shoulder        Physician: Rogerio Bialey Jr., *    Physician Orders: Eval & Treat  Medical Diagnosis: M75.52 (ICD-10-CM) - Bursitis of left shoulder   Surgical Procedure and Date: N/a  Evaluation Date: 1/18/2023  Insurance Authorization Period Expiration: 1/10/2024  Plan of Care Expiration: 4/18/2023  Progress Note Due: 2/21/2023   Date of Return to MD: 2/22/2023  Visit # / Visits authorized: 5/20 (6/12 in POC)  FOTO: 1/3 - TAKE NEXT SESSION     Total Visits Received: 6  Cancelled Visits: 2  No Show Visits: 0    Precautions:  Standard, DM2, HTN    Time In: 11:35am  Time Out: 12:20pm  Total Billable Time: 45 minutes    SUBJECTIVE     Pt update: "My progress has been great. I can move my shoulder more and it helps me because I am not that stressed anymore about it. I mopped the whole house this morning."    She was compliant with home exercise program given last session, completes some of the home exercise program every day.   Response to previous treatment: Good  Functional change: Mopped the house, began doing hair again      Pain: 0/10 upon arrival,  0/10 upon end of session   Location: left shoulder      OBJECTIVE     Objective Measures updated at progress report unless specified. - reassessed 2/16/2023    Palpation: Some pain with palpation to anterior shoulder       Shoulder ROM. Measured in degrees.    (R) UE (unaffected) (L) UE (1/18/23)   LUE 2/16/23     AROM AROM Norm AROM   Shoulder Flexion  140 125 180 140   Shoulder Abduction  140 110 0-180 138   Shoulder Extension  45 45 0-50 WNL   Shoulder Internal Rotation 90 45 0-90 72   Shoulder External " Rotation  90 68 0-90 82   Comment: Painful with all motions     Manual Muscle Test:   Patient decline to participate during eval due to pain, 3+ for all shoulder motions in limited ROM, pain with any resistance                                                        2/16/2023  Shoulder Flexion RUE:  4-   Shoulder Extension RUE:  4-   Shoulder Abduction RUE:   4   Internal Rotation RUE:  4-   External Rotation RUE:  4+         Shoulder Flexion LUE:  4-   Shoulder Extension LUE:  4-   Shoulder Abduction LUE:  4   Internal Rotation LUE:   4-   External Rotation LUE:   4+            Special Tests:   TEST 1/18/2023 1/18/2023 2/14/2023   Shoulder Left Right Left   Bentley Tao Positive  Negative  Slight positive (2/10)   Neer Imgingshun Positive  Negative  Slight positive (2/10)   Rin's  Negative  Negative  Negative   Empty Can (Supraspinatus) Positive  Negative  Negative      Posture Alignment : Slightly decreased but still slouched posture, forward head     Joint integrity: Normal     Scapular Control/Dyskinesis:     Normal / Subtle / Obvious   Left Subtle   Right Normal         Limitation/Restriction for FOTO Shoulder Survey     Therapist reviewed FOTO scores for Caroline Macias on 1/18/2023.   FOTO documents entered into [employer] - see Media section.     Limitation Score: 52% - UPDATE NEXT SESSION         Treatment     Caroline received the treatments listed below:     Therapeutic activities to improve functional performance for 45 minutes, including:  - 8 minutes arm bike to warm up for session  - Reassess objective measures  - supine stick flexion for increased mobility of shoulders and tolerance to activity x10  - supine shoulder abduction with stick  - new wings x10  - supine robot arms x10    Patient Education and Home Exercises      Education provided:   - Plan of care  - Progress towards goals     Written Home Exercises Provided: Patient instructed to cont prior HEP.  Exercises were reviewed and Caroline was able  to demonstrate them prior to the end of the session.  Caroline demonstrated good  understanding of the HEP provided. See EMR under Patient Instructions for exercises provided during therapy sessions.       Assessment     Update: Pt would continue to benefit from skilled OT. Patient has demonstrated great progress towards goals, and has met most short term goals. The patient experiences no pain with active range of motion of shoulders in any plane while completing controlled motion, but feels pain with sudden movements and has been avoiding strenuous lifting at home (like reorganizing furniture). The patient still demonstrates slight decreased strength with shoulder flexion and abduction, will continue to benefit from strengthening and increasing HEP.  Provided new HEP today to continue to benefit patient ROM and tolerance to activity at home, and discussed continuing plan of care    Caroline is progressing well towards her goals and there are no updates to goals at this time (see below). Pt prognosis is Good.     Pt will continue to benefit from skilled outpatient occupational therapy to address the deficits listed in the problem list on initial evaluation provide pt/family education and to maximize pt's level of independence in the home and community environment.     Pt's spiritual, cultural and educational needs considered and pt agreeable to plan of care and goals.    Previous Short Term Goals Status:   Mostly met  New Short Term Goals Status:   Updated with new goal about sudden movements  Long Term Goal Status:   continue per initial plan of care, specified about goal 6 (See bold)  Reasons for Recertification of Therapy:   Continues to have slight pain with sudden movements, tests positive for impingement, and demonstrates good progress towards long term goals thus far but still requires improvement    Anticipated barriers to occupational therapy: None    Goals:  Short Term Goals (STGs); to be met within 5 weeks  (2/21/2023).  1)  Patient to be IND with HEP to maximize R shoulder functional capacity. - met 2/16/2023   2)  Patient will report IND use of modalities at home for pain management.- met  2/16/2023   3)  Assess MMT of shoulder - met  2/16/2023   4)  Patient will report a pain level of 0 out of 10 during active range of motion without resistance. - met  2/6/2023    5) Increase shoulder external/internal rotation ROM by 25 degrees to increase functional arm use for ADLs/work/leisure activities. -  met  2/16/2023   6) New: Patient will report 0/10 pain with sudden movements of the shoulder of 45 degrees or less - NEW 2/16/2023      Long Term Goals :To be met by discharge.  1) Patient will report a pain level of 0 out of 10 during lifting of objects at home. - nearly met  2/16/2023   2) Increase shoulder strength to 4+/5 in all shoulder motions without pain. - progressing not met  2/16/2023   3) Increase ROM by 45 degrees to increase functional shoulder use for ADLs/work/leisure activities. - progressing not met  2/16/2023   4) Patient will demo improved FOTO score by 20 points. - progressing not met  2/16/2023   5) Pt will return to prior level of function for ADLs, IADLs, and household management. - progressing not met  2/16/2023   6) Pt will report 0/10 pain with sudden movements of the shoulder >45 degrees - progressing not met 2/16/2023       Plan     Certification Period: 2/16/2023 to 4/18/2023  Recommended Treatment Plan: Plan to continue as per individualized plan of care with Occupational Therapy 2 times weekly for 6 weeks for the remaining 6 visits to include the following interventions: Manual therapy/joint mobilizations, Modalities for pain management, Therapeutic exercises/activities., and Strengthening.   Other Recommendations: Continue, strengthen    CORTEZ Cassidy, OTR/L  2/16/2023      I CERTIFY THE NEED FOR THESE SERVICES FURNISHED UNDER THIS PLAN OF TREATMENT AND WHILE UNDER MY CARE    Physician's  comments:        Physician's Signature: ___________________________________________________

## 2023-02-16 ENCOUNTER — CLINICAL SUPPORT (OUTPATIENT)
Dept: REHABILITATION | Facility: HOSPITAL | Age: 48
End: 2023-02-16
Attending: ORTHOPAEDIC SURGERY
Payer: MEDICAID

## 2023-02-16 DIAGNOSIS — M75.42 IMPINGEMENT SYNDROME OF LEFT SHOULDER: ICD-10-CM

## 2023-02-16 DIAGNOSIS — R29.898 SHOULDER WEAKNESS: ICD-10-CM

## 2023-02-16 DIAGNOSIS — M25.612 DECREASED RANGE OF MOTION OF SHOULDER, LEFT: ICD-10-CM

## 2023-02-16 DIAGNOSIS — M25.512 LEFT SHOULDER PAIN, UNSPECIFIED CHRONICITY: Primary | ICD-10-CM

## 2023-02-16 PROCEDURE — 97530 THERAPEUTIC ACTIVITIES: CPT | Mod: PN

## 2023-02-17 NOTE — PROGRESS NOTES
"  [facility]   Outpatient Therapy Daily Note     Date: 2/23/2023  Name: Caroline Macias  [facility] Number: [id number]    Therapy Diagnosis:   Encounter Diagnoses   Name Primary?    Left shoulder pain, unspecified chronicity Yes    Decreased range of motion of shoulder, left     Shoulder weakness     Impingement syndrome of left shoulder          Physician: Rogerio Bailey Jr., *    Physician Orders: Eval & Treat  Medical Diagnosis: M75.52 (ICD-10-CM) - Bursitis of left shoulder   Surgical Procedure and Date: N/a  Evaluation Date: 1/18/2023  Insurance Authorization Period Expiration: 1/10/2024  Plan of Care Expiration: 4/18/2023  Progress Note Due: 3/21/2023  Date of Return to MD: not set  Visit # / Visits authorized: 6/20 (7/12 in POC)  FOTO: 2/3      Precautions:  Standard, DM2, HTN    Time In: 11:30am   Time Out: 12:15pm   Total Billable Time: 45 minutes    SUBJECTIVE     Pt update: "I feel a whole lot better. No pain, but some slight limited or weakness with sudden moves or heavy lifting. I saw my doctor and they were happy with my progress and said we can keep doing therapy through March because it's so helpful."    She was compliant with home exercise program given last session, completes some of the home exercise program every day.   Response to previous treatment: Fine, sore  Functional change: Caught some beads      Pain: 0/10 upon arrival,  0/10 upon end of session   Location: left shoulder      OBJECTIVE     Objective Measures updated at progress report unless specified. - reassessed 2/16/2023    Palpation: Some pain with palpation to anterior shoulder       Shoulder ROM. Measured in degrees.    (R) UE (unaffected) (L) UE (1/18/23)   LUE 2/16/23     AROM AROM Norm AROM   Shoulder Flexion  140 125 180 140   Shoulder Abduction  140 110 0-180 138   Shoulder Extension  45 45 0-50 WNL   Shoulder Internal Rotation 90 45 0-90 72   Shoulder External Rotation  90 68 0-90 82   Comment: Painful " with all motions     Manual Muscle Test:   Patient decline to participate during eval due to pain, 3+ for all shoulder motions in limited ROM, pain with any resistance                                                        2/16/2023  Shoulder Flexion RUE:  4-   Shoulder Extension RUE:  4-   Shoulder Abduction RUE:   4   Internal Rotation RUE:  4-   External Rotation RUE:  4+         Shoulder Flexion LUE:  4-   Shoulder Extension LUE:  4-   Shoulder Abduction LUE:  4   Internal Rotation LUE:   4-   External Rotation LUE:   4+            Special Tests:   TEST 1/18/2023 1/18/2023 2/14/2023   Shoulder Left Right Left   Bentley Tao Positive  Negative  Slight positive (2/10)   Neer Imgingshun Positive  Negative  Slight positive (2/10)   Keithrzeb's  Negative  Negative  Negative   Empty Can (Supraspinatus) Positive  Negative  Negative      Posture Alignment : Slightly decreased but still slouched posture, forward head     Joint integrity: Normal     Scapular Control/Dyskinesis:     Normal / Subtle / Obvious   Left Subtle   Right Normal         Limitation/Restriction for FOTO Shoulder Survey     Therapist reviewed FOTO scores for Caroline Macias on 1/18/2023.   FOTO documents entered into [employer] - see Media section.     Limitation Score:4% limitation remaining (patient completed questionaire backwards)         Treatment     Caroline received the treatments listed below:     Therapeutic activities to improve functional performance for 45 minutes, including:   - 8 minutes arm bike to warm up for session  - Towel exercises x10ea (flex, ext, bicep, tricep, shoulder rotation, etc.) (for 30 mins)  - Shoulder rolls (backward and forward) x10ea  - Cross arm stretch, pectoral stretch, doorway stretch x3-5 mins  - 4 corners active wrist and shoulder motion (green therabar) for stability and motion x10ea    Patient Education and Home Exercises      Education provided:   - Plan of care  - Progress towards goals     Written Home Exercises  Provided: Patient instructed to cont prior HEP.  Exercises were reviewed and Caroline was able to demonstrate them prior to the end of the session.  Caroline demonstrated good  understanding of the HEP provided. See EMR under Patient Instructions for exercises provided during therapy sessions.       Assessment     Pt would continue to benefit from skilled OT. Patient particiapted well in session focused on strengthening isometrically and working on stabilization of all shoulder and elbow motions today. Good understanding noted, some verbal cues needed for maintained posturing and ergonomics during exercises. Good response, educated on stretching methods today. Patient is very happy with progress and will benefit from continued strengthening.    Caroline is progressing well towards her goals and there are no updates to goals at this time. Pt prognosis is Good.     Pt will continue to benefit from skilled outpatient occupational therapy to address the deficits listed in the problem list on initial evaluation provide pt/family education and to maximize pt's level of independence in the home and community environment.     Pt's spiritual, cultural and educational needs considered and pt agreeable to plan of care and goals.    Anticipated barriers to occupational therapy: None    Goals:  Short Term Goals (STGs); to be met within 5 weeks (2/21/2023).  1)  Patient to be IND with HEP to maximize R shoulder functional capacity. - met 2/16/2023   2)  Patient will report IND use of modalities at home for pain management.- met  2/16/2023   3)  Assess MMT of shoulder - met  2/16/2023   4)  Patient will report a pain level of 0 out of 10 during active range of motion without resistance. - met  2/6/2023    5) Increase shoulder external/internal rotation ROM by 25 degrees to increase functional arm use for ADLs/work/leisure activities. -  met  2/16/2023   6) New: Patient will report 0/10 pain with sudden movements of the shoulder of 45  degrees or less - progressing not met 2/23/2023      Long Term Goals :To be met by discharge.  1) Patient will report a pain level of 0 out of 10 during lifting of objects at home. - nearly met  2/23/2023   2) Increase shoulder strength to 4+/5 in all shoulder motions without pain. - progressing not met  2/23/2023   3) Increase ROM by 45 degrees to increase functional shoulder use for ADLs/work/leisure activities. - progressing not met  2/23/2023   4) Patient will demo improved FOTO score by 20 points. - met 2/23/2023  5) Pt will return to prior level of function for ADLs, IADLs, and household management. - progressing not met  2/23/2023   6) Pt will report 0/10 pain with sudden movements of the shoulder >45 degrees - progressing not met 2/23/2023       Plan     Plan to continue as per individualized plan of care with Occupational Therapy 2 times weekly for 6 weeks for the remaining 6 visits to include the following interventions: Manual therapy/joint mobilizations, Modalities for pain management, Therapeutic exercises/activities., and Strengthening.     Other Recommendations: Continue, strengthen (do isometrics with resistance bands and active stabilization)    CORTEZ Cassidy, OTR/L  2/23/2023

## 2023-02-22 ENCOUNTER — OFFICE VISIT (OUTPATIENT)
Dept: ORTHOPEDICS | Facility: CLINIC | Age: 48
End: 2023-02-22
Payer: MEDICAID

## 2023-02-22 VITALS — HEIGHT: 66 IN | WEIGHT: 222 LBS | BODY MASS INDEX: 35.68 KG/M2

## 2023-02-22 DIAGNOSIS — M75.52 BURSITIS OF LEFT SHOULDER: Primary | ICD-10-CM

## 2023-02-22 PROCEDURE — 99213 OFFICE O/P EST LOW 20 MIN: CPT | Mod: PBBFAC,PN | Performed by: ORTHOPAEDIC SURGERY

## 2023-02-22 PROCEDURE — 1160F PR REVIEW ALL MEDS BY PRESCRIBER/CLIN PHARMACIST DOCUMENTED: ICD-10-PCS | Mod: CPTII,,, | Performed by: ORTHOPAEDIC SURGERY

## 2023-02-22 PROCEDURE — 1160F RVW MEDS BY RX/DR IN RCRD: CPT | Mod: CPTII,,, | Performed by: ORTHOPAEDIC SURGERY

## 2023-02-22 PROCEDURE — 99999 PR PBB SHADOW E&M-EST. PATIENT-LVL III: ICD-10-PCS | Mod: PBBFAC,,, | Performed by: ORTHOPAEDIC SURGERY

## 2023-02-22 PROCEDURE — 99999 PR PBB SHADOW E&M-EST. PATIENT-LVL III: CPT | Mod: PBBFAC,,, | Performed by: ORTHOPAEDIC SURGERY

## 2023-02-22 PROCEDURE — 99212 PR OFFICE/OUTPT VISIT, EST, LEVL II, 10-19 MIN: ICD-10-PCS | Mod: S$PBB,,, | Performed by: ORTHOPAEDIC SURGERY

## 2023-02-22 PROCEDURE — 1159F MED LIST DOCD IN RCRD: CPT | Mod: CPTII,,, | Performed by: ORTHOPAEDIC SURGERY

## 2023-02-22 PROCEDURE — 3008F BODY MASS INDEX DOCD: CPT | Mod: CPTII,,, | Performed by: ORTHOPAEDIC SURGERY

## 2023-02-22 PROCEDURE — 1159F PR MEDICATION LIST DOCUMENTED IN MEDICAL RECORD: ICD-10-PCS | Mod: CPTII,,, | Performed by: ORTHOPAEDIC SURGERY

## 2023-02-22 PROCEDURE — 99212 OFFICE O/P EST SF 10 MIN: CPT | Mod: S$PBB,,, | Performed by: ORTHOPAEDIC SURGERY

## 2023-02-22 PROCEDURE — 3008F PR BODY MASS INDEX (BMI) DOCUMENTED: ICD-10-PCS | Mod: CPTII,,, | Performed by: ORTHOPAEDIC SURGERY

## 2023-02-22 NOTE — PROGRESS NOTES
"Subjective:      Patient ID: Caroline Macias is a 47 y.o. female.    Chief Complaint: Shoulder Pain      HPI: Caroline Macias is here in follow-up of bilateral shoulder pain related to tendonitis/ bursitis. She was last seen and treated by Dr. Bailey 6 weeks ago with PT, Mortin, and left shoulder CSI. Today, she reports injection was very helpful. Today, he symptoms are completley resolved. She is currently in PT and feels like it was very helpful, she would like to complete her scheduled visits in March then continue HEP.     Past Medical History:   Diagnosis Date    Diabetes mellitus     Hyperlipemia     Hypertension        Current Outpatient Medications:     ascorbic acid, vitamin C, (VITAMIN C) 500 MG tablet, Take 500 mg by mouth., Disp: , Rfl:     cholecalciferol, vitamin D3, (VITAMIN D3) 25 mcg (1,000 unit) capsule, Take 1,000 Units by mouth., Disp: , Rfl:     cyanocobalamin (VITAMIN B-12) 1000 MCG tablet, Take 100 mcg by mouth., Disp: , Rfl:     diclofenac sodium (VOLTAREN) 1 % Gel, Apply 2 g topically 4 (four) times daily. for 14 days, Disp: 100 g, Rfl: 0    FARXIGA 10 mg tablet, Take 10 mg by mouth once daily., Disp: , Rfl:     ibuprofen (ADVIL,MOTRIN) 600 MG tablet, Take 1 tablet (600 mg total) by mouth 2 (two) times daily with meals., Disp: 60 tablet, Rfl: 2    ibuprofen (ADVIL,MOTRIN) 800 MG tablet, Take 800 mg by mouth 2 (two) times daily., Disp: , Rfl:     levocetirizine (XYZAL) 5 MG tablet, Take 5 mg by mouth., Disp: , Rfl:     losartan (COZAAR) 25 MG tablet, Take 25 mg by mouth once daily., Disp: , Rfl:     ONETOUCH ULTRA TEST Strp, 2 (two) times a day. Test, Disp: , Rfl:     rosuvastatin (CRESTOR) 20 MG tablet, Take 20 mg by mouth., Disp: , Rfl:     TRULICITY 4.5 mg/0.5 mL pen injector, SMARTSI.5 Milligram(s) SUB-Q Once a Week, Disp: , Rfl:   Review of patient's allergies indicates:  No Known Allergies    Ht 5' 6" (1.676 m)   Wt 100.7 kg (222 lb)   BMI 35.83 kg/m²     ROS      Objective:    Ortho " Exam       GEN: Well developed, well nourished female. AAOX3. No acute distress.   Normocephalic, atraumatic.   ALLISON  Breathing unlabored.  Mood and affect appropriate.   Left SHOULDER:  Skin: No rashes or lesions on exposed areas.  Atrophy: none noted.  Tenderness to palpation: None.  AROM (deg): abduction-full, flexion-full, rotation- unrestricted, painful rotation- absent.  Rotator cuff: normal  Bentley Impingement test- negative.  Neer Impingement test- negative.  Cross arm adduction test- negative.  Drop arm - negative.  Instability testing: negative.   Distal neuro: normal, normal 5/5 strength in all tested muscle groups and no muscle wasting or atrophy.  Pulses: Positive peripheral pulses..    Assessment:     Imaging: No new, previous reviewed.         1. Bursitis of left shoulder          Plan:       Sx completely resolved   Continue with PT and then HEP as needed       Follow up if symptoms worsen or fail to improve.

## 2023-02-23 ENCOUNTER — CLINICAL SUPPORT (OUTPATIENT)
Dept: REHABILITATION | Facility: HOSPITAL | Age: 48
End: 2023-02-23
Attending: ORTHOPAEDIC SURGERY
Payer: MEDICAID

## 2023-02-23 DIAGNOSIS — M25.512 LEFT SHOULDER PAIN, UNSPECIFIED CHRONICITY: Primary | ICD-10-CM

## 2023-02-23 DIAGNOSIS — M25.612 DECREASED RANGE OF MOTION OF SHOULDER, LEFT: ICD-10-CM

## 2023-02-23 DIAGNOSIS — M75.42 IMPINGEMENT SYNDROME OF LEFT SHOULDER: ICD-10-CM

## 2023-02-23 DIAGNOSIS — R29.898 SHOULDER WEAKNESS: ICD-10-CM

## 2023-02-23 PROCEDURE — 97530 THERAPEUTIC ACTIVITIES: CPT | Mod: PN

## 2023-02-23 NOTE — PROGRESS NOTES
"  [facility]   Outpatient Therapy Daily Note     Date: 2/27/2023  Name: Caroline Macias  [facility] Number: [id number]    Therapy Diagnosis:   Encounter Diagnoses   Name Primary?    Left shoulder pain, unspecified chronicity Yes    Decreased range of motion of shoulder, left     Shoulder weakness     Impingement syndrome of left shoulder        Physician: Rogerio Bailey Jr., *    Physician Orders: Eval & Treat  Medical Diagnosis: M75.52 (ICD-10-CM) - Bursitis of left shoulder   Surgical Procedure and Date: N/a  Evaluation Date: 1/18/2023  Insurance Authorization Period Expiration: 1/10/2024  Plan of Care Expiration: 4/18/2023  Progress Note Due: 3/21/2023  Date of Return to MD: not set  Visit # / Visits authorized: 7/20 (8/12 in POC)  FOTO: 2/3      Precautions:  Standard, DM2, HTN    Time In: 12:20pm  Time Out: 1:00pm  Total Billable Time: 40 minutes    SUBJECTIVE     Pt update: "It's fine."    She was compliant with home exercise program given last session, completes some of the home exercise program every day.   Response to previous treatment: Fine, a little sore  Functional change: Same    Pain: 0/10 upon arrival,  0/10 upon end of session   Location: left shoulder      OBJECTIVE     Objective Measures updated at progress report unless specified. - reassessed 2/16/2023    Palpation: Some pain with palpation to anterior shoulder       Shoulder ROM. Measured in degrees.    (R) UE (unaffected) (L) UE (1/18/23)   LUE 2/16/23     AROM AROM Norm AROM   Shoulder Flexion  140 125 180 140   Shoulder Abduction  140 110 0-180 138   Shoulder Extension  45 45 0-50 WNL   Shoulder Internal Rotation 90 45 0-90 72   Shoulder External Rotation  90 68 0-90 82   Comment: Painful with all motions     Manual Muscle Test:   Patient decline to participate during eval due to pain, 3+ for all shoulder motions in limited ROM, pain with any resistance                                                        " 2/16/2023  Shoulder Flexion RUE:  4-   Shoulder Extension RUE:  4-   Shoulder Abduction RUE:   4   Internal Rotation RUE:  4-   External Rotation RUE:  4+         Shoulder Flexion LUE:  4-   Shoulder Extension LUE:  4-   Shoulder Abduction LUE:  4   Internal Rotation LUE:   4-   External Rotation LUE:   4+            Special Tests:   TEST 1/18/2023 1/18/2023 2/14/2023   Shoulder Left Right Left   Bentley Tao Positive  Negative  Slight positive (2/10)   Santosh Azevedo Positive  Negative  Slight positive (2/10)   Rin's  Negative  Negative  Negative   Empty Can (Supraspinatus) Positive  Negative  Negative      Posture Alignment : Slightly decreased but still slouched posture, forward head     Joint integrity: Normal     Scapular Control/Dyskinesis:     Normal / Subtle / Obvious   Left Subtle   Right Normal         Limitation/Restriction for FOTO Shoulder Survey     Therapist reviewed FOTO scores for Caroline Macias on 1/18/2023.   FOTO documents entered into [employer] - see Media section.     Limitation Score:4% limitation remaining (patient completed questionaire backwards)         Treatment     Caroline received the treatments listed below:     Therapeutic activities to improve functional performance for 40 minutes, including:   - 8 minutes arm bike to warm up for session (4 forwards, 4 backwards)  - ABCs on wall with red ball (1kg) - 1x each arm (required rest breaks)  - 1kg ball rebounds - for tolerance to sudden motion or action related to arm/shoulder  - Arm arc with 1# weight x2each way  - Isometric shoulder holds with dynamic pulley hits x10 in 4 different positions of shoulder flexion  - Corner stretch 3x28clb    Patient Education and Home Exercises      Education provided:   - Plan of care  - Progress towards goals     Written Home Exercises Provided: Patient instructed to cont prior HEP.  Exercises were reviewed and Caroline was able to demonstrate them prior to the end of the session.  Caroline demonstrated good   understanding of the HEP provided. See EMR under Patient Instructions for exercises provided during therapy sessions.       Assessment     Pt would continue to benefit from skilled OT. Patient particiapted well in session focused on strengthening, especially with overhead and outreaching motions today. Some shoulder stabilization exercises today for decreasing pain during sudden motions, patient responded well. Fatigue noted following overhead and reaching activities, but good improvements noted.     Caroline is progressing well towards her goals and there are no updates to goals at this time. Pt prognosis is Good.     Pt will continue to benefit from skilled outpatient occupational therapy to address the deficits listed in the problem list on initial evaluation provide pt/family education and to maximize pt's level of independence in the home and community environment.     Pt's spiritual, cultural and educational needs considered and pt agreeable to plan of care and goals.    Anticipated barriers to occupational therapy: None    Goals:  Short Term Goals (STGs); to be met within 5 weeks (2/21/2023).  1)  Patient to be IND with HEP to maximize R shoulder functional capacity. - met 2/16/2023   2)  Patient will report IND use of modalities at home for pain management.- met  2/16/2023   3)  Assess MMT of shoulder - met  2/16/2023   4)  Patient will report a pain level of 0 out of 10 during active range of motion without resistance. - met  2/6/2023    5) Increase shoulder external/internal rotation ROM by 25 degrees to increase functional arm use for ADLs/work/leisure activities. -  met  2/16/2023   6) New: Patient will report 0/10 pain with sudden movements of the shoulder of 45 degrees or less - progressing not met 2/27/2023      Long Term Goals :To be met by discharge.  1) Patient will report a pain level of 0 out of 10 during lifting of objects at home. - nearly met  2/27/2023   2) Increase shoulder strength to 4+/5 in  all shoulder motions without pain. - progressing not met  2/27/2023   3) Increase ROM by 45 degrees to increase functional shoulder use for ADLs/work/leisure activities. - progressing not met  2/27/2023   4) Patient will demo improved FOTO score by 20 points. - met 2/23/2023  5) Pt will return to prior level of function for ADLs, IADLs, and household management. - progressing not met  2/27/2023   6) Pt will report 0/10 pain with sudden movements of the shoulder >45 degrees - progressing not met 2/27/2023       Plan     Plan to continue as per individualized plan of care with Occupational Therapy 2 times weekly for 6 weeks for the remaining 6 visits to include the following interventions: Manual therapy/joint mobilizations, Modalities for pain management, Therapeutic exercises/activities., and Strengthening.     Other Recommendations: Continue, strengthen (do isometrics with resistance bands and active stabilization)    CORTEZ Cassidy, OTR/L  2/27/2023

## 2023-02-27 ENCOUNTER — CLINICAL SUPPORT (OUTPATIENT)
Dept: REHABILITATION | Facility: HOSPITAL | Age: 48
End: 2023-02-27
Attending: ORTHOPAEDIC SURGERY
Payer: MEDICAID

## 2023-02-27 DIAGNOSIS — R29.898 SHOULDER WEAKNESS: ICD-10-CM

## 2023-02-27 DIAGNOSIS — M75.42 IMPINGEMENT SYNDROME OF LEFT SHOULDER: ICD-10-CM

## 2023-02-27 DIAGNOSIS — M25.512 LEFT SHOULDER PAIN, UNSPECIFIED CHRONICITY: Primary | ICD-10-CM

## 2023-02-27 DIAGNOSIS — M25.612 DECREASED RANGE OF MOTION OF SHOULDER, LEFT: ICD-10-CM

## 2023-02-27 PROCEDURE — 97530 THERAPEUTIC ACTIVITIES: CPT | Mod: PN

## 2023-03-04 ENCOUNTER — HOSPITAL ENCOUNTER (EMERGENCY)
Facility: HOSPITAL | Age: 48
Discharge: HOME | End: 2023-03-04
Attending: EMERGENCY MEDICINE
Payer: MEDICAID

## 2023-03-04 VITALS
SYSTOLIC BLOOD PRESSURE: 132 MMHG | HEIGHT: 66 IN | DIASTOLIC BLOOD PRESSURE: 82 MMHG | TEMPERATURE: 98 F | OXYGEN SATURATION: 99 % | RESPIRATION RATE: 18 BRPM | HEART RATE: 82 BPM | BODY MASS INDEX: 35.36 KG/M2 | WEIGHT: 220 LBS

## 2023-03-04 DIAGNOSIS — N39.0 URINARY TRACT INFECTION WITHOUT HEMATURIA, SITE UNSPECIFIED: Primary | ICD-10-CM

## 2023-03-04 LAB
ALBUMIN SERPL-MCNC: 3.6 G/DL (ref 3.3–5.5)
ALLENS TEST: ABNORMAL
ALP SERPL-CCNC: 90 U/L (ref 42–141)
BILIRUB SERPL-MCNC: 0.9 MG/DL (ref 0.2–1.6)
BILIRUBIN, POC UA: ABNORMAL
BLOOD, POC UA: ABNORMAL
BUN SERPL-MCNC: 7 MG/DL (ref 7–22)
CALCIUM SERPL-MCNC: 9.5 MG/DL (ref 8–10.3)
CHLORIDE SERPL-SCNC: 104 MMOL/L (ref 98–108)
CLARITY, POC UA: ABNORMAL
COLOR, POC UA: ABNORMAL
CREAT SERPL-MCNC: 0.7 MG/DL (ref 0.6–1.2)
CTP QC/QA: YES
GLUCOSE SERPL-MCNC: 139 MG/DL (ref 73–118)
GLUCOSE, POC UA: NEGATIVE
HCO3 UR-SCNC: 27.4 MMOL/L (ref 24–28)
INFLUENZA A ANTIGEN, POC: NEGATIVE
INFLUENZA B ANTIGEN, POC: NEGATIVE
KETONES, POC UA: ABNORMAL
LDH SERPL L TO P-CCNC: 0.71 MMOL/L (ref 0.5–2.2)
LEUKOCYTE EST, POC UA: ABNORMAL
NITRITE, POC UA: POSITIVE
PCO2 BLDA: 37.8 MMHG (ref 35–45)
PH SMN: 7.47 [PH] (ref 7.35–7.45)
PH UR STRIP: 5.5 [PH]
PO2 BLDA: 28 MMHG (ref 40–60)
POC ALT (SGPT): 43 U/L (ref 10–47)
POC AST (SGOT): 33 U/L (ref 11–38)
POC BE: 4 MMOL/L
POC CARDIAC TROPONIN I: 0 NG/ML (ref 0–0.08)
POC SATURATED O2: 56 % (ref 95–100)
POC TCO2: 27 MMOL/L (ref 18–33)
POC TCO2: 29 MMOL/L (ref 24–29)
POTASSIUM BLD-SCNC: 3.7 MMOL/L (ref 3.6–5.1)
PROTEIN, POC UA: ABNORMAL
PROTEIN, POC: 8.1 G/DL (ref 6.4–8.1)
SAMPLE: ABNORMAL
SAMPLE: NORMAL
SARS-COV-2 RDRP RESP QL NAA+PROBE: NEGATIVE
SITE: ABNORMAL
SODIUM BLD-SCNC: 140 MMOL/L (ref 128–145)
SPECIFIC GRAVITY, POC UA: 1.02
UROBILINOGEN, POC UA: 1 E.U./DL

## 2023-03-04 PROCEDURE — 87086 URINE CULTURE/COLONY COUNT: CPT | Performed by: EMERGENCY MEDICINE

## 2023-03-04 PROCEDURE — 80053 COMPREHEN METABOLIC PANEL: CPT | Mod: ER

## 2023-03-04 PROCEDURE — 87186 SC STD MICRODIL/AGAR DIL: CPT | Performed by: EMERGENCY MEDICINE

## 2023-03-04 PROCEDURE — 87077 CULTURE AEROBIC IDENTIFY: CPT | Performed by: EMERGENCY MEDICINE

## 2023-03-04 PROCEDURE — 87804 INFLUENZA ASSAY W/OPTIC: CPT | Mod: 59,ER

## 2023-03-04 PROCEDURE — 81003 URINALYSIS AUTO W/O SCOPE: CPT | Mod: ER

## 2023-03-04 PROCEDURE — 82803 BLOOD GASES ANY COMBINATION: CPT | Mod: ER

## 2023-03-04 PROCEDURE — 83880 ASSAY OF NATRIURETIC PEPTIDE: CPT | Mod: ER

## 2023-03-04 PROCEDURE — 84484 ASSAY OF TROPONIN QUANT: CPT | Mod: ER

## 2023-03-04 PROCEDURE — 25000003 PHARM REV CODE 250: Mod: ER | Performed by: EMERGENCY MEDICINE

## 2023-03-04 PROCEDURE — 99283 EMERGENCY DEPT VISIT LOW MDM: CPT | Mod: ER

## 2023-03-04 PROCEDURE — 87088 URINE BACTERIA CULTURE: CPT | Performed by: EMERGENCY MEDICINE

## 2023-03-04 PROCEDURE — 85025 COMPLETE CBC W/AUTO DIFF WBC: CPT | Mod: ER

## 2023-03-04 RX ORDER — CEPHALEXIN 500 MG/1
500 CAPSULE ORAL
Status: COMPLETED | OUTPATIENT
Start: 2023-03-04 | End: 2023-03-04

## 2023-03-04 RX ORDER — AMOXICILLIN AND CLAVULANATE POTASSIUM 875; 125 MG/1; MG/1
1 TABLET, FILM COATED ORAL 2 TIMES DAILY
Qty: 20 TABLET | Refills: 0 | Status: SHIPPED | OUTPATIENT
Start: 2023-03-04

## 2023-03-04 RX ADMIN — CEPHALEXIN 500 MG: 500 CAPSULE ORAL at 05:03

## 2023-03-06 LAB — BACTERIA UR CULT: ABNORMAL

## 2023-03-09 NOTE — PROGRESS NOTES
"[facility]   Outpatient Therapy Daily Note     Date: 3/13/2023  Name: Caroline Macias  [facility] Number: [id number]    Therapy Diagnosis:   Encounter Diagnoses   Name Primary?    Left shoulder pain, unspecified chronicity Yes    Decreased range of motion of shoulder, left     Shoulder weakness     Impingement syndrome of left shoulder          Physician: Rogerio Bailey Jr., *    Physician Orders: Eval & Treat  Medical Diagnosis: M75.52 (ICD-10-CM) - Bursitis of left shoulder   Surgical Procedure and Date: N/a  Evaluation Date: 1/18/2023  Insurance Authorization Period Expiration: 1/10/2024  Plan of Care Expiration: 4/18/2023  Progress Note Due: 3/21/2023  Date of Return to MD: not set  Visit # / Visits authorized: 8/20 (9/12 in POC)  FOTO: 2/3    Precautions:  Standard, DM2, HTN    Time In: 9:55am  Time Out: 1040am  Total Billable Time: 45 minutes    SUBJECTIVE     Pt update: "I'm doing alright, I just had a UTI but my shoulder has no pain, even during jerky activities"    She was compliant with home exercise program given last session, completes some of the home exercise program every day.   Response to previous treatment: N/a  Functional change: No pain during reaching    Pain: 0/10 upon arrival,  0/10 upon end of session   Location: left shoulder      OBJECTIVE     Objective Measures updated at progress report unless specified. - reassessed 2/16/2023    Palpation: Some pain with palpation to anterior shoulder       Shoulder ROM. Measured in degrees.    (R) UE (unaffected) (L) UE (1/18/23)   LUE 2/16/23     AROM AROM Norm AROM   Shoulder Flexion  140 125 180 140   Shoulder Abduction  140 110 0-180 138   Shoulder Extension  45 45 0-50 WNL   Shoulder Internal Rotation 90 45 0-90 72   Shoulder External Rotation  90 68 0-90 82   Comment: Painful with all motions     Manual Muscle Test:   Patient decline to participate during eval due to pain, 3+ for all shoulder motions in limited ROM, pain " with any resistance                                                        2/16/2023  Shoulder Flexion RUE:  4-   Shoulder Extension RUE:  4-   Shoulder Abduction RUE:   4   Internal Rotation RUE:  4-   External Rotation RUE:  4+         Shoulder Flexion LUE:  4-   Shoulder Extension LUE:  4-   Shoulder Abduction LUE:  4   Internal Rotation LUE:   4-   External Rotation LUE:   4+            Special Tests:   TEST 1/18/2023 1/18/2023 2/14/2023   Shoulder Left Right Left   Mc Tao Positive  Negative  Slight positive (2/10)   Santosh Azevedo Positive  Negative  Slight positive (2/10)   Rin's  Negative  Negative  Negative   Empty Can (Supraspinatus) Positive  Negative  Negative      Posture Alignment : Slightly decreased but still slouched posture, forward head     Joint integrity: Normal     Scapular Control/Dyskinesis:     Normal / Subtle / Obvious   Left Subtle   Right Normal         Limitation/Restriction for FOTO Shoulder Survey     Therapist reviewed FOTO scores for Caroline Macias on 1/18/2023.   FOTO documents entered into [employer] - see Media section.     Limitation Score:4% limitation remaining (patient completed questionaire backwards)         Treatment     Caroline received the treatments listed below:     Therapeutic activities to improve functional performance for 45 minutes, including:   - 10 minutes arm bike to warm up for session (6 forwards, 4 backwards)  - External and internal rotation (blue band) 2x10ea   - Banded pull-aparts 2x10  - Shoulder flexion with red theraband loop isometric resistance 2x10  - shoulder extension blue bands 2x10  - Ys, Ts, Ws, Is on wall 2x10ea    Patient Education and Home Exercises      Education provided:   - Theraband HEP  - Plan of care  - Progress towards goals     Written Home Exercises Provided: Patient instructed to cont prior HEP.  Exercises were reviewed and Caroline was able to demonstrate them prior to the end of the session.  Caroline demonstrated good   understanding of the HEP provided. See EMR under Patient Instructions for exercises provided during therapy sessions.       Assessment     Pt would continue to benefit from skilled OT. Patient particiapted well in session focused on strengthening and scapular stabilization to prevent any future pain or injury. Provided therabands and HEP routine to continue to decrease risk of future impingement or pain. No pain noted following end of session, patient appears to be approaching the ability to transition to HEP only for maintenance of CLOF. Slight generalized fatigue following session today, but not specifically in shoulder.    Caroline is progressing well towards her goals and there are no updates to goals at this time. Pt prognosis is Good.     Pt will continue to benefit from skilled outpatient occupational therapy to address the deficits listed in the problem list on initial evaluation provide pt/family education and to maximize pt's level of independence in the home and community environment.     Pt's spiritual, cultural and educational needs considered and pt agreeable to plan of care and goals.    Anticipated barriers to occupational therapy: None    Goals:  Short Term Goals (STGs); to be met within 5 weeks (2/21/2023).  1)  Patient to be IND with HEP to maximize R shoulder functional capacity. - met 2/16/2023   2)  Patient will report IND use of modalities at home for pain management.- met  2/16/2023   3)  Assess MMT of shoulder - met  2/16/2023   4)  Patient will report a pain level of 0 out of 10 during active range of motion without resistance. - met  2/6/2023    5) Increase shoulder external/internal rotation ROM by 25 degrees to increase functional arm use for ADLs/work/leisure activities. -  met  2/16/2023   6) New: Patient will report 0/10 pain with sudden movements of the shoulder of 45 degrees or less - progressing not met 3/13/2023      Long Term Goals :To be met by discharge.  1) Patient will report a  pain level of 0 out of 10 during lifting of objects at home. - nearly met  3/13/2023   2) Increase shoulder strength to 4+/5 in all shoulder motions without pain. - progressing not met  3/13/2023   3) Increase ROM by 45 degrees to increase functional shoulder use for ADLs/work/leisure activities. - progressing not met  3/13/2023   4) Patient will demo improved FOTO score by 20 points. - met 2/23/2023  5) Pt will return to prior level of function for ADLs, IADLs, and household management. - progressing not met  3/13/2023   6) Pt will report 0/10 pain with sudden movements of the shoulder >45 degrees - progressing not met 3/13/2023       Plan     Plan to continue as per individualized plan of care with Occupational Therapy 2 times weekly for 6 weeks for the remaining 6 visits to include the following interventions: Manual therapy/joint mobilizations, Modalities for pain management, Therapeutic exercises/activities., and Strengthening.     Other Recommendations: Discharge to [facility]?    CORTEZ Cassidy, OTR/L  3/13/2023

## 2023-03-13 ENCOUNTER — CLINICAL SUPPORT (OUTPATIENT)
Dept: REHABILITATION | Facility: HOSPITAL | Age: 48
End: 2023-03-13
Attending: ORTHOPAEDIC SURGERY
Payer: MEDICAID

## 2023-03-13 DIAGNOSIS — M25.612 DECREASED RANGE OF MOTION OF SHOULDER, LEFT: ICD-10-CM

## 2023-03-13 DIAGNOSIS — M25.512 LEFT SHOULDER PAIN, UNSPECIFIED CHRONICITY: Primary | ICD-10-CM

## 2023-03-13 DIAGNOSIS — M75.42 IMPINGEMENT SYNDROME OF LEFT SHOULDER: ICD-10-CM

## 2023-03-13 DIAGNOSIS — R29.898 SHOULDER WEAKNESS: ICD-10-CM

## 2023-03-13 PROCEDURE — 97530 THERAPEUTIC ACTIVITIES: CPT | Mod: PN

## 2023-03-13 NOTE — PATIENT INSTRUCTIONS
[facility], OCCUPATIONAL THERAPY  HOME EXERCISE PROGRAM     Complete the following strengthening exercises 1-2x/day.   Complete 10 repetitions each.       Ws, Ts, Ys, Is  Complete these standing against a wall, and if that gets too easy you can try it laying down (as pictured in image below). Focus on squeezing your shoulder blades every time you bring your arms back (off the wall or floor).       Banded Pull Downs  Hook the knot of your theraband in a doorway and close the door. Pull the band down towards your hips with straight arms.      Banded Shoulder Internal & External Rotation  Elbow stuck to side, bring your hand out away from where you have the band hooked (away from stomach). Complete on both sides. Then repeat again, bringing your hand in towards your stomach against the resistance. Complete on both sides.      Banded Pull-Aparts  Start with band taught, arms in front of you. Pull the band apart until your arms are even with your body.     Therapist: BEAR Card, CORTEZ, OTR/L

## 2023-03-15 ENCOUNTER — CLINICAL SUPPORT (OUTPATIENT)
Dept: REHABILITATION | Facility: HOSPITAL | Age: 48
End: 2023-03-15
Attending: ORTHOPAEDIC SURGERY
Payer: MEDICAID

## 2023-03-15 DIAGNOSIS — M75.42 IMPINGEMENT SYNDROME OF LEFT SHOULDER: ICD-10-CM

## 2023-03-15 DIAGNOSIS — M25.512 LEFT SHOULDER PAIN, UNSPECIFIED CHRONICITY: Primary | ICD-10-CM

## 2023-03-15 DIAGNOSIS — R29.898 SHOULDER WEAKNESS: ICD-10-CM

## 2023-03-15 DIAGNOSIS — M25.612 DECREASED RANGE OF MOTION OF SHOULDER, LEFT: ICD-10-CM

## 2023-03-15 PROCEDURE — 97530 THERAPEUTIC ACTIVITIES: CPT | Mod: PN

## 2023-04-04 NOTE — ED PROVIDER NOTES
Encounter Date: 3/4/2023       History     Chief Complaint   Patient presents with    Fever     Pt c/o fever , chills, and sweats since Thursday. Denies upper resp symptoms.      48-year-old female presents complaining of fevers chills and sweats since Thursday.  No other associated symptoms.    Review of patient's allergies indicates:  No Known Allergies  Past Medical History:   Diagnosis Date    Diabetes mellitus     Hyperlipemia     Hypertension      No past surgical history on file.  No family history on file.  Social History     Tobacco Use    Smoking status: Never    Smokeless tobacco: Never   Substance Use Topics    Alcohol use: Yes    Drug use: Yes     Types: Marijuana     Review of Systems   Constitutional:  Positive for chills, diaphoresis and fever.   HENT:  Negative for sore throat.    Eyes:  Negative for visual disturbance.   Respiratory:  Negative for shortness of breath.    Cardiovascular:  Negative for chest pain.   Gastrointestinal:  Negative for abdominal pain.   Genitourinary:  Negative for difficulty urinating.   Musculoskeletal:  Negative for back pain.   Skin:  Negative for rash.   Neurological:  Negative for headaches.     Physical Exam     Initial Vitals   BP Pulse Resp Temp SpO2   03/04/23 1520 03/04/23 1520 03/04/23 1520 03/04/23 1521 03/04/23 1520   131/85 83 17 98.5 °F (36.9 °C) 99 %      MAP       --                Physical Exam    Nursing note and vitals reviewed.  Constitutional: She appears well-developed and well-nourished.   HENT:   Mouth/Throat: Oropharynx is clear and moist.   Eyes: EOM are normal. Pupils are equal, round, and reactive to light.   Neck: Neck supple. No thyromegaly present. No JVD present.   Normal range of motion.  Cardiovascular:  Normal rate, regular rhythm, normal heart sounds and intact distal pulses.     Exam reveals no gallop and no friction rub.       No murmur heard.  Pulmonary/Chest: Breath sounds normal. No respiratory distress.   Abdominal: Abdomen is  soft. Bowel sounds are normal. There is no abdominal tenderness.   No CVA tenderness. There is no rebound.   Musculoskeletal:         General: No tenderness or edema. Normal range of motion.      Cervical back: Normal range of motion and neck supple.     Neurological: She is alert and oriented to person, place, and time. She has normal strength.   Skin: Skin is warm and dry.       ED Course   Procedures  Labs Reviewed   CULTURE, URINE - Abnormal; Notable for the following components:       Result Value    Urine Culture, Routine   (*)     Value: ESCHERICHIA COLI  >100,000 cfu/ml      All other components within normal limits    Narrative:     Indicated criteria for high risk culture:->Other  Other (specify):->UTI   POCT URINALYSIS W/O SCOPE - Abnormal; Notable for the following components:    Bilirubin, UA 1+ (*)     Ketones, UA 1+ (*)     Blood, UA 2+ (*)     Protein, UA 3+ (*)     Nitrite, UA Positive (*)     Leukocytes, UA 1+ (*)     All other components within normal limits   ISTAT PROCEDURE - Abnormal; Notable for the following components:    POC PH 7.469 (*)     POC PO2 28 (*)     POC SATURATED O2 56 (*)     All other components within normal limits   POCT CMP - Abnormal; Notable for the following components:    POC Glucose 139 (*)     All other components within normal limits   TROPONIN ISTAT   SARS-COV-2 RDRP GENE    Narrative:     This test utilizes isothermal nucleic acid amplification technology to detect the SARS-CoV-2 RdRp nucleic acid segment. The analytical sensitivity (limit of detection) is 500 copies/swab.     A POSITIVE result is indicative of the presence of SARS-CoV-2 RNA; clinical correlation with patient history and other diagnostic information is necessary to determine patient infection status.    A NEGATIVE result means that SARS-CoV-2 nucleic acids are not present above the limit of detection. A NEGATIVE result should be treated as presumptive. It does not rule out the possibility of  COVID-19 and should not be the sole basis for treatment decisions. If COVID-19 is strongly suspected based on clinical and exposure history, re-testing using an alternate molecular assay should be considered.     This test is only for use under the Food and Drug Administration s Emergency Use Authorization (EUA).     Commercial kits are provided by [employer]. Performance characteristics of the EUA have been independently verified by [facility] Department of Pathology and Laboratory Medicine.   _________________________________________________________________   The authorized Fact Sheet for Healthcare Providers and the authorized Fact Sheet for Patients of the ID NOW COVID-19 are available on the FDA website:    https://www.fda.gov/media/132454/download      https://www.fda.gov/media/418444/download      POCT CBC   POCT URINALYSIS W/O SCOPE   POCT RAPID INFLUENZA A/B   POCT CMP   POCT TROPONIN   Patient has a nitrite positive urine.  Will treat for urinary tract infection.  Vital signs stable.  White blood cell count upper limits of normal.  Tolerating PO.  No clinical signs of pyelonephritis.       Imaging Results    None          Medications   cephALEXin capsule 500 mg (500 mg Oral Given 3/4/23 1726)                              Clinical Impression:   Final diagnoses:  [N39.0] Urinary tract infection without hematuria, site unspecified (Primary)        ED Disposition Condition    Discharge Stable          ED Prescriptions       Medication Sig Dispense Start Date End Date Auth. Provider    amoxicillin-clavulanate 875-125mg (AUGMENTIN) 875-125 mg per tablet Take 1 tablet by mouth 2 (two) times daily. 20 tablet 3/4/2023 -- Gianluca Virgen MD          Follow-up Information       Follow up With Specialties Details Why Contact [facility] ED Emergency Medicine  As needed [address] [id number] [phone]             Gianluca Virgen MD  04/04/23  2133

## 2024-05-23 ENCOUNTER — TELEPHONE (OUTPATIENT)
Dept: OPHTHALMOLOGY | Facility: CLINIC | Age: 49
End: 2024-05-23
Payer: MEDICAID